# Patient Record
Sex: MALE | Race: WHITE | NOT HISPANIC OR LATINO | Employment: FULL TIME | URBAN - METROPOLITAN AREA
[De-identification: names, ages, dates, MRNs, and addresses within clinical notes are randomized per-mention and may not be internally consistent; named-entity substitution may affect disease eponyms.]

---

## 2017-01-16 ENCOUNTER — TELEPHONE (OUTPATIENT)
Dept: FAMILY MEDICINE | Facility: CLINIC | Age: 47
End: 2017-01-16

## 2017-01-16 NOTE — TELEPHONE ENCOUNTER
Spoke to pharmacy to notify them that we have not received anything stating the pt needed a pa. She will fax over the notice. Once received will initiate.

## 2017-01-16 NOTE — TELEPHONE ENCOUNTER
----- Message from Tim Roberts sent at 1/16/2017  1:32 PM CST -----  Contact: Patient   Patient called regarding Pre Approval for med tadalafil (CIALIS) 20 MG Tab - Stated he has been awaiting med Approval - Please submit approval to [facility] - [place], [place] - [address] [address] - Please call patient at  [phone] to advise when med approved.

## 2017-01-19 ENCOUNTER — TELEPHONE (OUTPATIENT)
Dept: FAMILY MEDICINE | Facility: CLINIC | Age: 47
End: 2017-01-19

## 2017-01-19 NOTE — TELEPHONE ENCOUNTER
----- Message from Marissa Orona sent at 1/19/2017 12:03 PM CST -----  [employer] calling concerning prior authorization for medication: Cialis 20 mg/needs to know if patient has history of certain diseases and conditions/please call back at [phone].

## 2017-01-20 NOTE — TELEPHONE ENCOUNTER
----- Message from Layla Trujillo sent at 1/19/2017  2:19 PM CST -----  Contact: Andreas Patino   [phone]  The representative returning the nurse call regarding PA for rx cialis.Thanks!

## 2017-01-23 ENCOUNTER — TELEPHONE (OUTPATIENT)
Dept: FAMILY MEDICINE | Facility: CLINIC | Age: 47
End: 2017-01-23

## 2017-01-23 NOTE — TELEPHONE ENCOUNTER
----- Message from Inessa Zurita sent at 1/23/2017  3:15 PM CST -----  Contact: Patient  Gaetano, patient [phone], Returning call to Diane regarding his Rx. Please advise, Thanks.

## 2017-01-23 NOTE — TELEPHONE ENCOUNTER
----- Message from Natacha Santiago sent at 1/23/2017 12:58 PM CST -----  Contact: self  Call placed to supervisor. Patient is upset, has been trying for a month to get a refill on Cyalis with no luck.  Please call back at .

## 2019-08-28 ENCOUNTER — TELEPHONE (OUTPATIENT)
Dept: FAMILY MEDICINE | Facility: CLINIC | Age: 49
End: 2019-08-28

## 2019-12-14 ENCOUNTER — TELEPHONE (OUTPATIENT)
Dept: FAMILY MEDICINE | Facility: CLINIC | Age: 49
End: 2019-12-14

## 2024-01-31 ENCOUNTER — TELEPHONE (OUTPATIENT)
Dept: FAMILY MEDICINE | Facility: CLINIC | Age: 54
End: 2024-01-31
Payer: COMMERCIAL

## 2024-01-31 NOTE — TELEPHONE ENCOUNTER
----- Message from Paula Gallegos sent at 1/31/2024  8:50 AM CST -----  Contact: patient  Type:  Patient Returning Call    Who Called:patient     Who Left Message for Patient:    Does the patient know what this is regarding?:yes     Would the patient rather a call back or a response via [employer]? Call     Best Call Back Number:[phone] (home)      Additional Information:

## 2024-01-31 NOTE — TELEPHONE ENCOUNTER
Pt. Declined appt. With provider.  He states that inspire salesperson gave him incorrect office to contact for implantation of inspire device for DANIELA.

## 2024-02-06 ENCOUNTER — OFFICE VISIT (OUTPATIENT)
Dept: OTOLARYNGOLOGY | Facility: CLINIC | Age: 54
End: 2024-02-06
Payer: COMMERCIAL

## 2024-02-06 VITALS — WEIGHT: 263.44 LBS | HEIGHT: 76 IN | BODY MASS INDEX: 32.08 KG/M2

## 2024-02-06 DIAGNOSIS — J34.2 DNS (DEVIATED NASAL SEPTUM): ICD-10-CM

## 2024-02-06 DIAGNOSIS — Z78.9 INTOLERANCE OF CONTINUOUS POSITIVE AIRWAY PRESSURE (CPAP) VENTILATION: ICD-10-CM

## 2024-02-06 DIAGNOSIS — G47.33 OSA (OBSTRUCTIVE SLEEP APNEA): Primary | ICD-10-CM

## 2024-02-06 DIAGNOSIS — R40.0 DAYTIME SOMNOLENCE: ICD-10-CM

## 2024-02-06 PROCEDURE — 1159F MED LIST DOCD IN RCRD: CPT | Mod: CPTII,S$GLB,, | Performed by: STUDENT IN AN ORGANIZED HEALTH CARE EDUCATION/TRAINING PROGRAM

## 2024-02-06 PROCEDURE — 99204 OFFICE O/P NEW MOD 45 MIN: CPT | Mod: S$GLB,,, | Performed by: STUDENT IN AN ORGANIZED HEALTH CARE EDUCATION/TRAINING PROGRAM

## 2024-02-06 PROCEDURE — 3008F BODY MASS INDEX DOCD: CPT | Mod: CPTII,S$GLB,, | Performed by: STUDENT IN AN ORGANIZED HEALTH CARE EDUCATION/TRAINING PROGRAM

## 2024-02-06 PROCEDURE — 99999 PR PBB SHADOW E&M-EST. PATIENT-LVL III: CPT | Mod: PBBFAC,,, | Performed by: STUDENT IN AN ORGANIZED HEALTH CARE EDUCATION/TRAINING PROGRAM

## 2024-02-06 PROCEDURE — 4010F ACE/ARB THERAPY RXD/TAKEN: CPT | Mod: CPTII,S$GLB,, | Performed by: STUDENT IN AN ORGANIZED HEALTH CARE EDUCATION/TRAINING PROGRAM

## 2024-02-06 RX ORDER — LOSARTAN POTASSIUM 100 MG/1
100 TABLET ORAL DAILY
COMMUNITY

## 2024-02-06 RX ORDER — METOPROLOL SUCCINATE 25 MG/1
25 TABLET, EXTENDED RELEASE ORAL NIGHTLY
COMMUNITY
Start: 2024-01-26

## 2024-02-06 RX ORDER — AMLODIPINE BESYLATE 10 MG/1
10 TABLET ORAL DAILY
COMMUNITY

## 2024-02-06 NOTE — PROGRESS NOTES
Otolaryngology Clinic Note    Subjective:       Patient ID: Gaetano Glass is a 53 y.o. male.    Chief Complaint: consult inspire      History of Present Illness: Gaetano Glass is a 53 y.o. male presenting with DANIELA with CPAP intolerance. Started with nose pillows, would not stay on, tried nasal mask, would not seal, then full face, worked for a bit but not sealing. Last wore 1 month ago. Did notice improved sleep at first with CPAP, but has not managed to tolerate and keep masks on all night. Seal and hoses issue. Tosses and turns at night.   AHI 77.3 on HSAT from 3/31/23. He has looked into inspire. No central apneas on his study.   He does mouth breathe at night.   He has HTN on meds. No other cardiac hx no strokes. He does not need cialis. Hurt lower back.   He had tonsillectomy. No nasal surgeries.   Non smoker.     ESS 15 today.       Past Surgical History:   Procedure Laterality Date    THYROIDECTOMY, PARTIAL      as a child    TONSILLECTOMY      as a child     Past Medical History:   Diagnosis Date    History of hypothyroidism      Social Determinants of Health     Tobacco Use: Medium Risk (2/6/2024)    Patient History     Smoking Tobacco Use: Former     Smokeless Tobacco Use: Unknown     Passive Exposure: Not on file   Alcohol Use: Not on file   Financial Resource Strain: Not on file   Food Insecurity: Not on file   Transportation Needs: Not on file   Physical Activity: Not on file   Stress: Not on file   Social Connections: Not on file   Housing Stability: Not on file   Depression: Not on file     Review of patient's allergies indicates:   Allergen Reactions    Pcn [penicillins] Anaphylaxis    Tetanus vaccines and toxoid Hives    Lisinopril Other (See Comments)     Current Outpatient Medications   Medication Instructions    amLODIPine (NORVASC) 10 mg, Oral    fluticasone (FLONASE) 50 mcg/actuation nasal spray 1 spray, Each Nostril, 2 times daily PRN    ibuprofen (ADVIL,MOTRIN) 600 mg, Oral, Every 6 hours PRN     losartan (COZAAR) 100 mg, Oral    metoprolol succinate (TOPROL-XL) 25 mg, Oral    tadalafiL (CIALIS) 20 mg, Oral, Daily         ENT ROS negative except as stated above.     Patient answers are not available for this visit.            Objective:      There were no vitals filed for this visit.    General: NAD, well appearing  Eyes: Normal conjunctiva and lids  Face: symmetric, nerve intact  Nose: The nose is without any evidence of any deformity. The nasal mucosa is moist. The septum has spur on left with mild obstruction on left. There is no evidence of septal hematoma. The turbinates are without abnormality.   Ears: The ears are with normal-appearing pinna. Examination of the canals is normal appearing bilaterally. There is no drainage or erythema noted. The tympanic membranes are normal appearing with pearly color, normal-appearing landmarks and normal light reflex. Hearing is grossly intact.  Mouth: No obvious abnormalities to the lips. The teeth are unremarkable. The gingivae are without any obvious evidence of infection or lesion. The oral mucosa is moist and pink. There are no obvious masses to the hard or soft palate.   Oropharynx: The uvula is midline.  The tongue is midline. The posterior pharynx is without erythema or exudate. The tonsils are absent. Uvula is elongated.  Salivary glands: The salivary glands are symmetric and not enlarged, no masses  Neck: No lymphadenopathy, trachea midline, thryoid not enlarged, large neck circumference  Psych: Normal mood and affect.   Neuro: Grossly intact  Speech: fluent  Central adiposity    Test Review: I personally reviewed [facility] 3/2023 with 77.3 AHI     Assessment and Plan:       1. DANIELA (obstructive sleep apnea)    2. Intolerance of continuous positive airway pressure (CPAP) ventilation    3. DNS (deviated nasal septum)    4. Daytime somnolence          Reviewed inspire criteria and surgery. He would like to proceed with sleep endoscopy. He does have DNS but no nasal  complaints    I discussed the risks of hypoglossal nerve stimulation including: scar, bleeding, numbness of incision, numbness or weakness of tongue or marginal mandibular nerve, irritation of tongue from stimulation, implant failure, inadequate improvement, need for further procedures, need for removal of implant, infection, pneumothorax, MRI incompatibility.     RTC: TBD    Plan of care was discussed in detail with the patient, who agreed with the plan as above. All questions were answered in detail.     Liane Camacho MD  Otolaryngology

## 2024-02-06 NOTE — H&P (VIEW-ONLY)
Otolaryngology Clinic Note    Subjective:       Patient ID: Gaetano Glass is a 53 y.o. male.    Chief Complaint: consult inspire      History of Present Illness: Gaetano Glass is a 53 y.o. male presenting with DANIELA with CPAP intolerance. Started with nose pillows, would not stay on, tried nasal mask, would not seal, then full face, worked for a bit but not sealing. Last wore 1 month ago. Did notice improved sleep at first with CPAP, but has not managed to tolerate and keep masks on all night. Seal and hoses issue. Tosses and turns at night.   AHI 77.3 on HSAT from 3/31/23. He has looked into inspire. No central apneas on his study.   He does mouth breathe at night.   He has HTN on meds. No other cardiac hx no strokes. He does not need cialis. Hurt lower back.   He had tonsillectomy. No nasal surgeries.   Non smoker.     ESS 15 today.       Past Surgical History:   Procedure Laterality Date    THYROIDECTOMY, PARTIAL      as a child    TONSILLECTOMY      as a child     Past Medical History:   Diagnosis Date    History of hypothyroidism      Social Determinants of Health     Tobacco Use: Medium Risk (2/6/2024)    Patient History     Smoking Tobacco Use: Former     Smokeless Tobacco Use: Unknown     Passive Exposure: Not on file   Alcohol Use: Not on file   Financial Resource Strain: Not on file   Food Insecurity: Not on file   Transportation Needs: Not on file   Physical Activity: Not on file   Stress: Not on file   Social Connections: Not on file   Housing Stability: Not on file   Depression: Not on file     Review of patient's allergies indicates:   Allergen Reactions    Pcn [penicillins] Anaphylaxis    Tetanus vaccines and toxoid Hives    Lisinopril Other (See Comments)     Current Outpatient Medications   Medication Instructions    amLODIPine (NORVASC) 10 mg, Oral    fluticasone (FLONASE) 50 mcg/actuation nasal spray 1 spray, Each Nostril, 2 times daily PRN    ibuprofen (ADVIL,MOTRIN) 600 mg, Oral, Every 6 hours PRN     losartan (COZAAR) 100 mg, Oral    metoprolol succinate (TOPROL-XL) 25 mg, Oral    tadalafiL (CIALIS) 20 mg, Oral, Daily         ENT ROS negative except as stated above.     Patient answers are not available for this visit.            Objective:      There were no vitals filed for this visit.    General: NAD, well appearing  Eyes: Normal conjunctiva and lids  Face: symmetric, nerve intact  Nose: The nose is without any evidence of any deformity. The nasal mucosa is moist. The septum has spur on left with mild obstruction on left. There is no evidence of septal hematoma. The turbinates are without abnormality.   Ears: The ears are with normal-appearing pinna. Examination of the canals is normal appearing bilaterally. There is no drainage or erythema noted. The tympanic membranes are normal appearing with pearly color, normal-appearing landmarks and normal light reflex. Hearing is grossly intact.  Mouth: No obvious abnormalities to the lips. The teeth are unremarkable. The gingivae are without any obvious evidence of infection or lesion. The oral mucosa is moist and pink. There are no obvious masses to the hard or soft palate.   Oropharynx: The uvula is midline.  The tongue is midline. The posterior pharynx is without erythema or exudate. The tonsils are absent. Uvula is elongated.  Salivary glands: The salivary glands are symmetric and not enlarged, no masses  Neck: No lymphadenopathy, trachea midline, thryoid not enlarged, large neck circumference  Psych: Normal mood and affect.   Neuro: Grossly intact  Speech: fluent  Central adiposity    Test Review: I personally reviewed [facility] 3/2023 with 77.3 AHI     Assessment and Plan:       1. DANIELA (obstructive sleep apnea)    2. Intolerance of continuous positive airway pressure (CPAP) ventilation    3. DNS (deviated nasal septum)    4. Daytime somnolence          Reviewed inspire criteria and surgery. He would like to proceed with sleep endoscopy. He does have DNS but no nasal  complaints    I discussed the risks of hypoglossal nerve stimulation including: scar, bleeding, numbness of incision, numbness or weakness of tongue or marginal mandibular nerve, irritation of tongue from stimulation, implant failure, inadequate improvement, need for further procedures, need for removal of implant, infection, pneumothorax, MRI incompatibility.     RTC: TBD    Plan of care was discussed in detail with the patient, who agreed with the plan as above. All questions were answered in detail.     Liane Camacho MD  Otolaryngology

## 2024-02-21 RX ORDER — SEMAGLUTIDE 0.68 MG/ML
0.25 INJECTION, SOLUTION SUBCUTANEOUS
COMMUNITY

## 2024-02-23 ENCOUNTER — ANESTHESIA EVENT (OUTPATIENT)
Dept: SURGERY | Facility: HOSPITAL | Age: 54
End: 2024-02-23
Payer: COMMERCIAL

## 2024-02-26 ENCOUNTER — HOSPITAL ENCOUNTER (OUTPATIENT)
Facility: HOSPITAL | Age: 54
Discharge: HOME | End: 2024-02-26
Attending: STUDENT IN AN ORGANIZED HEALTH CARE EDUCATION/TRAINING PROGRAM | Admitting: STUDENT IN AN ORGANIZED HEALTH CARE EDUCATION/TRAINING PROGRAM
Payer: COMMERCIAL

## 2024-02-26 ENCOUNTER — ANESTHESIA (OUTPATIENT)
Dept: SURGERY | Facility: HOSPITAL | Age: 54
End: 2024-02-26
Payer: COMMERCIAL

## 2024-02-26 DIAGNOSIS — G47.33 OSA (OBSTRUCTIVE SLEEP APNEA): Primary | ICD-10-CM

## 2024-02-26 DIAGNOSIS — Z78.9 INTOLERANCE OF CONTINUOUS POSITIVE AIRWAY PRESSURE (CPAP) VENTILATION: ICD-10-CM

## 2024-02-26 LAB — GLUCOSE SERPL-MCNC: 85 MG/DL (ref 70–110)

## 2024-02-26 PROCEDURE — 25000003 PHARM REV CODE 250: Mod: PO | Performed by: NURSE ANESTHETIST, CERTIFIED REGISTERED

## 2024-02-26 PROCEDURE — 63600175 PHARM REV CODE 636 W HCPCS: Mod: PO | Performed by: ANESTHESIOLOGY

## 2024-02-26 PROCEDURE — 42975 DISE EVAL SLP DO BRTH FLX DX: CPT | Mod: ,,, | Performed by: STUDENT IN AN ORGANIZED HEALTH CARE EDUCATION/TRAINING PROGRAM

## 2024-02-26 PROCEDURE — 36000708 HC OR TIME LEV III 1ST 15 MIN: Mod: PO | Performed by: STUDENT IN AN ORGANIZED HEALTH CARE EDUCATION/TRAINING PROGRAM

## 2024-02-26 PROCEDURE — 25000003 PHARM REV CODE 250: Mod: PO | Performed by: ANESTHESIOLOGY

## 2024-02-26 PROCEDURE — 63600175 PHARM REV CODE 636 W HCPCS: Mod: PO | Performed by: NURSE ANESTHETIST, CERTIFIED REGISTERED

## 2024-02-26 PROCEDURE — 71000033 HC RECOVERY, INTIAL HOUR: Mod: PO | Performed by: STUDENT IN AN ORGANIZED HEALTH CARE EDUCATION/TRAINING PROGRAM

## 2024-02-26 PROCEDURE — 25000003 PHARM REV CODE 250: Mod: PO | Performed by: STUDENT IN AN ORGANIZED HEALTH CARE EDUCATION/TRAINING PROGRAM

## 2024-02-26 PROCEDURE — 37000008 HC ANESTHESIA 1ST 15 MINUTES: Mod: PO | Performed by: STUDENT IN AN ORGANIZED HEALTH CARE EDUCATION/TRAINING PROGRAM

## 2024-02-26 PROCEDURE — D9220A PRA ANESTHESIA: Mod: CRNA,,, | Performed by: NURSE ANESTHETIST, CERTIFIED REGISTERED

## 2024-02-26 PROCEDURE — 71000015 HC POSTOP RECOV 1ST HR: Mod: PO | Performed by: STUDENT IN AN ORGANIZED HEALTH CARE EDUCATION/TRAINING PROGRAM

## 2024-02-26 PROCEDURE — D9220A PRA ANESTHESIA: Mod: ANES,,, | Performed by: ANESTHESIOLOGY

## 2024-02-26 RX ORDER — LIDOCAINE HYDROCHLORIDE 10 MG/ML
1 INJECTION, SOLUTION EPIDURAL; INFILTRATION; INTRACAUDAL; PERINEURAL ONCE
Status: ACTIVE | OUTPATIENT
Start: 2024-02-26

## 2024-02-26 RX ORDER — FENTANYL CITRATE 50 UG/ML
25 INJECTION, SOLUTION INTRAMUSCULAR; INTRAVENOUS EVERY 5 MIN PRN
Status: ACTIVE | OUTPATIENT
Start: 2024-02-26

## 2024-02-26 RX ORDER — PROPOFOL 10 MG/ML
VIAL (ML) INTRAVENOUS
Status: DISCONTINUED | OUTPATIENT
Start: 2024-02-26 | End: 2024-02-26

## 2024-02-26 RX ORDER — ONDANSETRON HYDROCHLORIDE 2 MG/ML
4 INJECTION, SOLUTION INTRAVENOUS DAILY PRN
Status: ACTIVE | OUTPATIENT
Start: 2024-02-26

## 2024-02-26 RX ORDER — PROPOFOL 10 MG/ML
VIAL (ML) INTRAVENOUS CONTINUOUS PRN
Status: DISCONTINUED | OUTPATIENT
Start: 2024-02-26 | End: 2024-02-26

## 2024-02-26 RX ORDER — LIDOCAINE HYDROCHLORIDE 40 MG/ML
INJECTION, SOLUTION RETROBULBAR
Status: DISCONTINUED | OUTPATIENT
Start: 2024-02-26 | End: 2024-02-26 | Stop reason: HOSPADM

## 2024-02-26 RX ORDER — OXYMETAZOLINE HCL 0.05 %
SPRAY, NON-AEROSOL (ML) NASAL
Status: DISCONTINUED | OUTPATIENT
Start: 2024-02-26 | End: 2024-02-26 | Stop reason: HOSPADM

## 2024-02-26 RX ORDER — LIDOCAINE HYDROCHLORIDE 20 MG/ML
INJECTION INTRAVENOUS
Status: DISCONTINUED | OUTPATIENT
Start: 2024-02-26 | End: 2024-02-26

## 2024-02-26 RX ORDER — SODIUM CHLORIDE, SODIUM LACTATE, POTASSIUM CHLORIDE, CALCIUM CHLORIDE 600; 310; 30; 20 MG/100ML; MG/100ML; MG/100ML; MG/100ML
INJECTION, SOLUTION INTRAVENOUS CONTINUOUS
Status: DISPENSED | OUTPATIENT
Start: 2024-02-26

## 2024-02-26 RX ORDER — OXYMETAZOLINE HCL 0.05 %
2 SPRAY, NON-AEROSOL (ML) NASAL 2 TIMES DAILY
Status: ACTIVE | OUTPATIENT
Start: 2024-02-26 | End: 2024-02-29

## 2024-02-26 RX ORDER — MEPERIDINE HYDROCHLORIDE 50 MG/ML
12.5 INJECTION INTRAMUSCULAR; INTRAVENOUS; SUBCUTANEOUS ONCE AS NEEDED
Status: ACTIVE | OUTPATIENT
Start: 2024-02-26 | End: 2024-02-27

## 2024-02-26 RX ORDER — OXYCODONE HYDROCHLORIDE 5 MG/1
5 TABLET ORAL
Status: ACTIVE | OUTPATIENT
Start: 2024-02-26

## 2024-02-26 RX ORDER — HYDROMORPHONE HYDROCHLORIDE 2 MG/ML
0.2 INJECTION, SOLUTION INTRAMUSCULAR; INTRAVENOUS; SUBCUTANEOUS EVERY 5 MIN PRN
Status: ACTIVE | OUTPATIENT
Start: 2024-02-26

## 2024-02-26 RX ORDER — DIPHENHYDRAMINE HYDROCHLORIDE 50 MG/ML
12.5 INJECTION INTRAMUSCULAR; INTRAVENOUS EVERY 6 HOURS PRN
Status: ACTIVE | OUTPATIENT
Start: 2024-02-26

## 2024-02-26 RX ADMIN — SODIUM CHLORIDE, SODIUM LACTATE, POTASSIUM CHLORIDE, AND CALCIUM CHLORIDE: .6; .31; .03; .02 INJECTION, SOLUTION INTRAVENOUS at 11:02

## 2024-02-26 RX ADMIN — OXYMETAZOLINE HYDROCHLORIDE 2 SPRAY: 0.05 SPRAY NASAL at 11:02

## 2024-02-26 RX ADMIN — Medication 20 MG: at 12:02

## 2024-02-26 RX ADMIN — LIDOCAINE HYDROCHLORIDE 20 MG: 20 INJECTION INTRAVENOUS at 12:02

## 2024-02-26 RX ADMIN — PROPOFOL 100 MCG/KG/MIN: 10 INJECTION, EMULSION INTRAVENOUS at 12:02

## 2024-02-26 RX ADMIN — Medication 60 MG: at 12:02

## 2024-02-26 RX ADMIN — GLYCOPYRROLATE 0.2 MG: 0.2 INJECTION INTRAMUSCULAR; INTRAVENOUS at 12:02

## 2024-02-26 NOTE — TRANSFER OF CARE
"Anesthesia Transfer of Care Note    Patient: Gaetano Glass    Procedure(s) Performed: Procedure(s) (LRB):  SLEEP ENDOSCOPY,DRUG-INDUCED (Bilateral)    Patient location: PACU    Anesthesia Type: general    Transport from OR: Transported from OR on room air with adequate spontaneous ventilation    Post pain: adequate analgesia    Post assessment: no apparent anesthetic complications and tolerated procedure well    Post vital signs: stable    Level of consciousness: awake and responds to stimulation    Nausea/Vomiting: no nausea/vomiting    Complications: none    Transfer of care protocol was followed      Last vitals: Visit Vitals  /77 (BP Location: Right arm, Patient Position: Lying)   Pulse 79   Temp 36.6 °C (97.8 °F)   Resp 18   Ht 6' 4" (1.93 m)   Wt 119.3 kg (263 lb)   SpO2 95%   BMI 32.01 kg/m²     "

## 2024-02-26 NOTE — DISCHARGE SUMMARY
[facility] - Surgery  Discharge Note  Short Stay    Procedure(s) (LRB):  SLEEP ENDOSCOPY,DRUG-INDUCED (Bilateral)      OUTCOME: Patient tolerated treatment/procedure well without complication and is now ready for discharge.    DISPOSITION: Home or Self Care    FINAL DIAGNOSIS:  <principal problem not specified>    FOLLOWUP: In clinic    DISCHARGE INSTRUCTIONS:  No discharge procedures on file.     TIME SPENT ON DISCHARGE: 10 minutes  
No

## 2024-02-26 NOTE — OP NOTE
Otolaryngology- Head & Neck Surgery  Operative Report    Gaetano Glass  [id number]  1970    Date of surgery: 2/26/2024    Preoperative Diagnosis:   DANIELA (obstructive sleep apnea) [G47.33]  Intolerance of continuous positive airway pressure (CPAP) ventilation [Z78.9]    Postoperative Diagnosis:    Same    Procedure:   1. Drug induced sleep endoscopy    Attending:  Liane Camacho MD    Anesthesia: General     EBL: none    Complications: None    Findings: see below      Disposition: Stable, to PACU    Preoperative Indication:   Gaetano Glass is a 53 y.o. male who has been noted to have severe sleep apnea and CPAP intolerance, here for drug induced sleep endoscopy.       Description of Procedure:  Patient was brought to the operating room and placed on the table in supine position.  Anesthesia was obtained via IV sedation.    Procedure: Risks, benefits, and alternatives of the procedure were discussed with the patient, and the patient consented to the fiberoptic examination.  After adequate anesthesia was obtained, the flexible fiberoptic scope was passed into each nostril independently, with the patient in the supine position.  Each nasal cavity, the entire pharynx (nasopharynx to hypopharynx) and the larynx were visualized. At the end of the examination, the scope was removed. The patient tolerated the procedure well with no complications.     Findings:  -     Nasal septum: LEFT mild deviation   -     Inferior turbinate: hypertrophy or edema (Mild) bilaterally  -     With snoring or modified mullers:    Velopharynx collapse: anterior, very mild lateral    Tongue Base collapse: severe, with <10mm retrolingual space    Other sites of obstruction:  epiglottis collapses posterior with every inspiration obstructing airway  -     Lingual tonsil has mild hypertrophy  -     Larynx mucosa is normal    Posterior commissure has no hypertrophy    no vocal fold immobility    mass/lesion: none  -     Other findings: he is an inspire  candidate, but also likely needs and epiglottic stiffening.         At the end of the procedure, the patient was awakened from anesthesia and transferred to the PACU in good condition.      Liane Camacho MD  Otolaryngology Attending

## 2024-02-26 NOTE — ANESTHESIA PREPROCEDURE EVALUATION
02/26/2024  Gaetano Glass is a 53 y.o., male.      Pre-op Assessment    I have reviewed the Patient Summary Reports.     I have reviewed the Nursing Notes. I have reviewed the NPO Status.   I have reviewed the Medications.     Review of Systems  Anesthesia Hx:  No problems with previous Anesthesia                Social:  Former Smoker       Cardiovascular:     Hypertension                                  Hypertension         Pulmonary:        Sleep Apnea                Endocrine:  Diabetes Hypothyroidism   Diabetes                          Physical Exam  General: Well nourished, Cooperative, Alert and Oriented    Airway:  Mallampati: II   Mouth Opening: Normal  TM Distance: Normal  Tongue: Normal    Dental:  Dentures    Chest/Lungs:  Normal Respiratory Rate    Heart:  Rate: Normal        Anesthesia Plan  Type of Anesthesia, risks & benefits discussed:    Anesthesia Type: Gen Natural Airway  Intra-op Monitoring Plan: Standard ASA Monitors  Induction:  IV  Informed Consent: Informed consent signed with the Patient and all parties understand the risks and agree with anesthesia plan.  All questions answered.   ASA Score: 3    Ready For Surgery From Anesthesia Perspective.     .

## 2024-02-26 NOTE — PLAN OF CARE
Called patient post op to discuss his sleep endoscopy.   He is an inspire candidate but epiglottis collapses significantly posteriorly every time he inhales while asleep. It does improve with jaw thrust but he is also stimulated by this so I am not sure that inspire will solve this alone. While I cannot guarantee that epiglottic stiffening along with resolve his sleep apnea as his AHI is 77.3, I think it is reasonable to do the less invasive option first and recheck his sleep apnea. I did offer doing epiglottic stiffening and inspire at the same time, he is ok staging and assessing in between. We will likely get a sleep study 2-3 months after surgery.     Case request placed for direct laryngoscopy with epiglottic stiffening.   Risks include pain, bleeding, infection, dysphagia, damage to teeth, nearby structures, throat discomfort. This will be outpatient and he will need to plan for light activity for about 10 days. He agrees to proceed.     Liane Camacho MD

## 2024-02-27 ENCOUNTER — TELEPHONE (OUTPATIENT)
Dept: OTOLARYNGOLOGY | Facility: CLINIC | Age: 54
End: 2024-02-27
Payer: COMMERCIAL

## 2024-02-27 VITALS
DIASTOLIC BLOOD PRESSURE: 83 MMHG | OXYGEN SATURATION: 98 % | WEIGHT: 263 LBS | HEART RATE: 60 BPM | BODY MASS INDEX: 32.03 KG/M2 | RESPIRATION RATE: 18 BRPM | SYSTOLIC BLOOD PRESSURE: 146 MMHG | HEIGHT: 76 IN | TEMPERATURE: 98 F

## 2024-02-27 NOTE — TELEPHONE ENCOUNTER
----- Message from Liane Camacho MD sent at 2/26/2024  4:46 PM CST -----  Case request placed for DL with epiglottic stiffening. Needs to be [facility] for equipment. thanks

## 2024-03-20 ENCOUNTER — TELEPHONE (OUTPATIENT)
Dept: OTOLARYNGOLOGY | Facility: CLINIC | Age: 54
End: 2024-03-20
Payer: COMMERCIAL

## 2024-03-20 NOTE — TELEPHONE ENCOUNTER
Called patient to review. Pain worse POD 4-5. Pain with carbonation, ravioli with tomato sauce. Doing ok with mashed potatoes mostly.    Advised him to give this another week or so of monitoring diet and should continue to improve.     Liane Camacho MD

## 2024-03-20 NOTE — TELEPHONE ENCOUNTER
----- Message from Karen Celine sent at 3/20/2024  3:13 PM CDT -----  Regarding: advice  Contact: patient  Type: Needs Medical Advice  Who Called:  patient  Symptoms (please be specific):    How long has patient had these symptoms:    Pharmacy name and phone #:    Best Call Back Number: [phone]    Additional Information: patient states his swallowing is still not good.  When he drinks a Dr. Pepper it feels like his throat is on fire.  Please call patient to advise.  Thanks!

## 2024-03-20 NOTE — TELEPHONE ENCOUNTER
----- Message from Karen Celine sent at 3/20/2024  3:13 PM CDT -----  Regarding: advice  Contact: patient  Type: Needs Medical Advice  Who Called:  patient  Symptoms (please be specific):    How long has patient had these symptoms:    Pharmacy name and phone #:    Best Call Back Number: [phone]    Additional Information: patient states his swallowing is still not good.  When he drinks a Dr. Pepper it feels like his throat is on fire.  Please call patient to advise.  Thanks!

## 2024-04-04 ENCOUNTER — OFFICE VISIT (OUTPATIENT)
Dept: OTOLARYNGOLOGY | Facility: CLINIC | Age: 54
End: 2024-04-04
Payer: COMMERCIAL

## 2024-04-04 VITALS — BODY MASS INDEX: 30.98 KG/M2 | HEIGHT: 76 IN | WEIGHT: 254.44 LBS

## 2024-04-04 DIAGNOSIS — Z78.9 INTOLERANCE OF CONTINUOUS POSITIVE AIRWAY PRESSURE (CPAP) VENTILATION: ICD-10-CM

## 2024-04-04 DIAGNOSIS — G47.33 OSA (OBSTRUCTIVE SLEEP APNEA): Primary | ICD-10-CM

## 2024-04-04 PROCEDURE — 1159F MED LIST DOCD IN RCRD: CPT | Mod: CPTII,S$GLB,, | Performed by: STUDENT IN AN ORGANIZED HEALTH CARE EDUCATION/TRAINING PROGRAM

## 2024-04-04 PROCEDURE — 4010F ACE/ARB THERAPY RXD/TAKEN: CPT | Mod: CPTII,S$GLB,, | Performed by: STUDENT IN AN ORGANIZED HEALTH CARE EDUCATION/TRAINING PROGRAM

## 2024-04-04 PROCEDURE — 31575 DIAGNOSTIC LARYNGOSCOPY: CPT | Mod: S$GLB,,, | Performed by: STUDENT IN AN ORGANIZED HEALTH CARE EDUCATION/TRAINING PROGRAM

## 2024-04-04 PROCEDURE — 99999 PR PBB SHADOW E&M-EST. PATIENT-LVL III: CPT | Mod: PBBFAC,,, | Performed by: STUDENT IN AN ORGANIZED HEALTH CARE EDUCATION/TRAINING PROGRAM

## 2024-04-04 PROCEDURE — 3044F HG A1C LEVEL LT 7.0%: CPT | Mod: CPTII,S$GLB,, | Performed by: STUDENT IN AN ORGANIZED HEALTH CARE EDUCATION/TRAINING PROGRAM

## 2024-04-04 PROCEDURE — 99024 POSTOP FOLLOW-UP VISIT: CPT | Mod: S$GLB,,, | Performed by: STUDENT IN AN ORGANIZED HEALTH CARE EDUCATION/TRAINING PROGRAM

## 2024-04-04 NOTE — PROGRESS NOTES
ENT POST OP    Epiiglottic stiffening 3/7/24    S: Seems like his sleep is slightly better. Waking up once or not at all. Slightly more rested. Swallowing and pain is better. Wife has noted he is not snoring when napping now.     Exam;   Procedure: Flexible laryngoscopy  Indications: post op  Verbal consent obtained  Anesthesia: lidocaine and phenylephrine nasal spray  Procedure: Scope was passed into right or left nare, to nasopharynx and down to visualize the glottis. Findings below. Patient tolerated procedure well.     - Nose WNL  - Nasopharynx- WNL, no masses  - Oropharynx- BOT symmetric, no masses, lingual tonsils 1-2+. Epiglottis with perforation in right side. Tip abnormal but edges were not touched during surgery. Healed well otherwise.   - Hypopharynx and piriform sinuses- no masses on trumpet maneuver  - Supraglottis- Arytenoids intact, no masses, not edematous or erythematous  - Glottis- Bilateral vocal folds intact with full motion, no masses  - Glottic closure- complete      AHI 77.3 on HSAT from 3/31/23.     A/P:  S/p DISE/epiglottic stiffening. Perforation in epiglottis but no issues related to this.  Suspect sleep is to some degree better. Will get HSAT to assess if he needs inspire or not at this point. Does have some lingual tonsil hypertrophy but inspire more likely to be beneficial if needed.   Reviewed inspire surgery risks and benefits.   Will contact after HSAT    Liane Camacho MD

## 2024-10-03 ENCOUNTER — TELEPHONE (OUTPATIENT)
Dept: OTOLARYNGOLOGY | Facility: CLINIC | Age: 54
End: 2024-10-03
Payer: COMMERCIAL

## 2024-10-03 NOTE — TELEPHONE ENCOUNTER
Pt called to schedule an appt because his sleeping issues have returned. Pt would like to readdress Inspire as an option. Appt made for 10/18, pt confirmed.

## 2024-10-03 NOTE — TELEPHONE ENCOUNTER
----- Message from Rosa sent at 10/3/2024  9:55 AM CDT -----  Regarding: Needs return call  Type: Needs Medical Advice  Who Called:  Alvino    Best Call Back Number: [phone]    Additional Information: Pt states dr did a procedure on him, was told to come back in 6months, he wanted to talk to the office regarding this and regarding a new problem

## 2024-10-18 ENCOUNTER — OFFICE VISIT (OUTPATIENT)
Dept: OTOLARYNGOLOGY | Facility: CLINIC | Age: 54
End: 2024-10-18
Payer: COMMERCIAL

## 2024-10-18 VITALS — HEIGHT: 76 IN | BODY MASS INDEX: 33.13 KG/M2 | WEIGHT: 272.06 LBS

## 2024-10-18 DIAGNOSIS — G47.33 OSA (OBSTRUCTIVE SLEEP APNEA): Primary | ICD-10-CM

## 2024-10-18 DIAGNOSIS — Z78.9 INTOLERANCE OF CONTINUOUS POSITIVE AIRWAY PRESSURE (CPAP) VENTILATION: ICD-10-CM

## 2024-10-18 PROCEDURE — 99999 PR PBB SHADOW E&M-EST. PATIENT-LVL IV: CPT | Mod: PBBFAC,,, | Performed by: STUDENT IN AN ORGANIZED HEALTH CARE EDUCATION/TRAINING PROGRAM

## 2024-10-18 NOTE — PROGRESS NOTES
Otolaryngology Clinic Note    Subjective:       Patient ID: Gaetano Glass is a 53 y.o. male.    Chief Complaint: Sleep Apnea and Consult (Discuss inspire )      History of Present Illness: Gaetano Glass is a 53 y.o. male presenting with DANIELA, CPAP intolerance. S/p DISE/epiglottic stiffening. Perforation in epiglottis noted at post op but no issues related to this.  New HSAT with AHI 16.4. Was doing better sleep wise but worse again. He is snoring more, wife noting worse. Also with fall asleep really easily after work while sitting up. Has to be moving. Is side sleeping. Is spitting his dentures out again. Has gained 10 lbs in past year. Still on ozempic. Does note that since surgery, he is more prone to choking on liquids or saliva. Is not every day. Has to pay more attention to swallowing.      Past Surgical History:   Procedure Laterality Date    MICROLARYNGOSCOPY, WITH EPIGLOTTOPEXY Bilateral 3/7/2024    Procedure: MICROLARYNGOSCOPY,WITH - Epliglottic stiffening;  Surgeon: Liane Camacho MD;  Location: [facility] OR;  Service: ENT;  Laterality: Bilateral;    SLEEP ENDOSCOPY, DRUG-INDUCED Bilateral 2/26/2024    Procedure: SLEEP ENDOSCOPY,DRUG-INDUCED;  Surgeon: Liane Camacho MD;  Location: [facility] OR;  Service: ENT;  Laterality: Bilateral;    THYROIDECTOMY, PARTIAL      as a child    TONSILLECTOMY      as a child     Past Medical History:   Diagnosis Date    Diabetes mellitus     History of hypothyroidism     Hypertension     Sleep apnea     don't use C-PAP     Social Drivers of Health     Tobacco Use: Medium Risk (10/18/2024)    Patient History     Smoking Tobacco Use: Former     Smokeless Tobacco Use: Never     Passive Exposure: Not on file   Alcohol Use: Not At Risk (10/15/2024)    AUDIT-C     Frequency of Alcohol Consumption: Monthly or less     Average Number of Drinks: 3 or 4     Frequency of Binge Drinking: Less than monthly   Financial Resource Strain: Low Risk  (10/15/2024)    Overall Financial Resource Strain  (CARDIA)     Difficulty of Paying Living Expenses: Not hard at all   Food Insecurity: No Food Insecurity (10/15/2024)    Hunger Vital Sign     Worried About Running Out of Food in the Last Year: Never true     Ran Out of Food in the Last Year: Never true   Transportation Needs: Not on file   Physical Activity: Unknown (10/15/2024)    Exercise Vital Sign     Days of Exercise per Week: 5 days     Minutes of Exercise per Session: Not on file   Stress: Stress Concern Present (10/15/2024)    [employer] - Occupational Stress Questionnaire     Feeling of Stress : To some extent   Housing Stability: Unknown (10/15/2024)    Housing Stability Vital Sign     Unable to Pay for Housing in the Last Year: No     Homeless in the Last Year: Not on file   Depression: Not at risk (3/30/2022)    [facility]    PHQ-2     PHQ-2 Score: 0   Utilities: Not At Risk (10/15/2024)    [facility] Utilities     Threatened with loss of utilities: No   Health Literacy: Adequate Health Literacy (10/15/2024)     Health Literacy     Frequency of need for help with medical instructions: Never   Social Isolation: Not on file     Review of patient's allergies indicates:   Allergen Reactions    Pcn [penicillins] Anaphylaxis    Tetanus vaccines and toxoid Hives    Lisinopril Other (See Comments)     Current Outpatient Medications   Medication Instructions    amLODIPine (NORVASC) 10 mg, Daily    fluticasone (FLONASE) 50 mcg/actuation nasal spray 1 spray, Each Nostril, 2 times daily PRN    ibuprofen (ADVIL,MOTRIN) 600 mg, Every 6 hours PRN    losartan (COZAAR) 100 mg, Daily    metoprolol succinate (TOPROL-XL) 25 mg, Nightly    ondansetron (ZOFRAN-ODT) 4 mg, Oral, Every 6 hours PRN    OZEMPIC 0.25 mg, Every Monday    tadalafiL (CIALIS) 20 mg, Oral, Daily         ENT ROS negative except as stated above.     Patient answers are not available for this visit.            Objective:       There were no vitals filed for this visit.    General: NAD, well appearing, overweight.   Eyes: Normal conjunctiva and lids  Face: symmetric, nerve intact  Nose: The nose is without any evidence of any deformity. The nasal mucosa is moist. The septum is midline. There is no evidence of septal hematoma. The turbinates are without abnormality.   Ears: The ears are with normal-appearing pinna. Examination of the canals is normal appearing bilaterally. There is no drainage or erythema noted. The tympanic membranes are normal appearing with pearly color, normal-appearing landmarks and normal light reflex. Hearing is grossly intact.  Mouth: No obvious abnormalities to the lips. The teeth are unremarkable. The gingivae are without any obvious evidence of infection or lesion. The oral mucosa is moist and pink. There are no obvious masses to the hard or soft palate.   Oropharynx: The uvula is midline.  The tongue is midline. The posterior pharynx is without erythema or exudate. The tonsils are normal appearing.  Salivary glands: The salivary glands are symmetric and not enlarged, no masses  Neck: No lymphadenopathy, trachea midline, thryoid not enlarged.  Psych: Normal mood and affect.   Neuro: Grossly intact  Speech: fluent    Test Review: I personally reviewed HSAT      '    DISE 2/26/24: Findings:  -     Nasal septum: LEFT mild deviation   -     Inferior turbinate: hypertrophy or edema (Mild) bilaterally  -     With snoring or modified mullers:                          Velopharynx collapse: anterior, very mild lateral                          Tongue Base collapse: severe, with <10mm retrolingual space                          Other sites of obstruction:  epiglottis collapses posterior with every inspiration obstructing airway  -     Lingual tonsil has mild hypertrophy  -     Larynx       mucosa is normal                          Posterior commissure has no hypertrophy                          no vocal fold immobility                           mass/lesion: none  -     Other findings: he is an inspire candidate, but also likely needs epiglottic stiffening.      Assessment and Plan:       1. DANIELA (obstructive sleep apnea)    2. Intolerance of continuous positive airway pressure (CPAP) ventilation    3. BMI 33.0-33.9,adult            He is interested in the inspire implant. He had DISE Which confirmed pattern of collapse. He had epiglottic stiffening which significantly reduced his AHI but sleep still bothersome and still 16.4 AHI.   I discussed the risks of hypoglossal nerve stimulation including: scar, bleeding, numbness of incision, numbness or weakness of tongue or marginal mandibular nerve, irritation of tongue from stimulation, implant failure, inadequate improvement, need for further procedures, need for removal of implant, infection, pneumothorax, MRI incompatibility.   Body mass index is 33.11 kg/m².    Dysphagia: does have some intermittent issues with liquids since surgery which may be related to perforation in epiglottis. No real surgical options to resolve this so will continue to monitor.     RTC: 1 week post op    Plan of care was discussed in detail with the patient, who agreed with the plan as above. All questions were answered in detail.     Liane Camacho MD  Otolaryngology

## 2024-11-22 ENCOUNTER — ANESTHESIA EVENT (OUTPATIENT)
Dept: SURGERY | Facility: HOSPITAL | Age: 54
End: 2024-11-22
Payer: COMMERCIAL

## 2024-11-25 ENCOUNTER — ANESTHESIA (OUTPATIENT)
Dept: SURGERY | Facility: HOSPITAL | Age: 54
End: 2024-11-25
Payer: COMMERCIAL

## 2024-11-25 ENCOUNTER — HOSPITAL ENCOUNTER (OUTPATIENT)
Dept: RADIOLOGY | Facility: HOSPITAL | Age: 54
Discharge: HOME | End: 2024-11-25
Attending: STUDENT IN AN ORGANIZED HEALTH CARE EDUCATION/TRAINING PROGRAM | Admitting: STUDENT IN AN ORGANIZED HEALTH CARE EDUCATION/TRAINING PROGRAM
Payer: COMMERCIAL

## 2024-11-25 ENCOUNTER — HOSPITAL ENCOUNTER (OUTPATIENT)
Facility: HOSPITAL | Age: 54
Discharge: HOME | End: 2024-11-25
Attending: STUDENT IN AN ORGANIZED HEALTH CARE EDUCATION/TRAINING PROGRAM | Admitting: STUDENT IN AN ORGANIZED HEALTH CARE EDUCATION/TRAINING PROGRAM
Payer: COMMERCIAL

## 2024-11-25 DIAGNOSIS — G47.33 OSA (OBSTRUCTIVE SLEEP APNEA): Primary | ICD-10-CM

## 2024-11-25 DIAGNOSIS — Z78.9 INTOLERANCE OF CONTINUOUS POSITIVE AIRWAY PRESSURE (CPAP) VENTILATION: ICD-10-CM

## 2024-11-25 DIAGNOSIS — G47.30 SLEEP APNEA: ICD-10-CM

## 2024-11-25 LAB — GLUCOSE SERPL-MCNC: 91 MG/DL (ref 70–110)

## 2024-11-25 PROCEDURE — 71000015 HC POSTOP RECOV 1ST HR: Mod: PO | Performed by: STUDENT IN AN ORGANIZED HEALTH CARE EDUCATION/TRAINING PROGRAM

## 2024-11-25 PROCEDURE — 71045 X-RAY EXAM CHEST 1 VIEW: CPT | Mod: TC,FY,PO

## 2024-11-25 PROCEDURE — C1767 GENERATOR, NEURO NON-RECHARG: HCPCS | Mod: PO | Performed by: STUDENT IN AN ORGANIZED HEALTH CARE EDUCATION/TRAINING PROGRAM

## 2024-11-25 PROCEDURE — 71045 X-RAY EXAM CHEST 1 VIEW: CPT | Mod: 26,,, | Performed by: RADIOLOGY

## 2024-11-25 PROCEDURE — C1778 LEAD, NEUROSTIMULATOR: HCPCS | Mod: PO | Performed by: STUDENT IN AN ORGANIZED HEALTH CARE EDUCATION/TRAINING PROGRAM

## 2024-11-25 PROCEDURE — 25000242 PHARM REV CODE 250 ALT 637 W/ HCPCS: Mod: PO | Performed by: ANESTHESIOLOGY

## 2024-11-25 PROCEDURE — 64582 OPN MPLTJ HPGLSL NSTM ARY PG: CPT | Mod: RT,,, | Performed by: STUDENT IN AN ORGANIZED HEALTH CARE EDUCATION/TRAINING PROGRAM

## 2024-11-25 PROCEDURE — 82962 GLUCOSE BLOOD TEST: CPT | Mod: PO | Performed by: STUDENT IN AN ORGANIZED HEALTH CARE EDUCATION/TRAINING PROGRAM

## 2024-11-25 PROCEDURE — C1787 PATIENT PROGR, NEUROSTIM: HCPCS | Mod: PO | Performed by: STUDENT IN AN ORGANIZED HEALTH CARE EDUCATION/TRAINING PROGRAM

## 2024-11-25 PROCEDURE — 27201423 OPTIME MED/SURG SUP & DEVICES STERILE SUPPLY: Mod: PO | Performed by: STUDENT IN AN ORGANIZED HEALTH CARE EDUCATION/TRAINING PROGRAM

## 2024-11-25 PROCEDURE — 63600175 PHARM REV CODE 636 W HCPCS: Mod: PO | Performed by: NURSE ANESTHETIST, CERTIFIED REGISTERED

## 2024-11-25 PROCEDURE — D9220A PRA ANESTHESIA: Mod: ANES,,, | Performed by: ANESTHESIOLOGY

## 2024-11-25 PROCEDURE — 37000009 HC ANESTHESIA EA ADD 15 MINS: Mod: PO | Performed by: STUDENT IN AN ORGANIZED HEALTH CARE EDUCATION/TRAINING PROGRAM

## 2024-11-25 PROCEDURE — 63600175 PHARM REV CODE 636 W HCPCS: Mod: PO | Performed by: STUDENT IN AN ORGANIZED HEALTH CARE EDUCATION/TRAINING PROGRAM

## 2024-11-25 PROCEDURE — 71000033 HC RECOVERY, INTIAL HOUR: Mod: PO | Performed by: STUDENT IN AN ORGANIZED HEALTH CARE EDUCATION/TRAINING PROGRAM

## 2024-11-25 PROCEDURE — D9220A PRA ANESTHESIA: Mod: CRNA,,, | Performed by: NURSE ANESTHETIST, CERTIFIED REGISTERED

## 2024-11-25 PROCEDURE — 36000706: Mod: PO | Performed by: STUDENT IN AN ORGANIZED HEALTH CARE EDUCATION/TRAINING PROGRAM

## 2024-11-25 PROCEDURE — 63600175 PHARM REV CODE 636 W HCPCS: Mod: PO | Performed by: ANESTHESIOLOGY

## 2024-11-25 PROCEDURE — 25000003 PHARM REV CODE 250: Mod: PO | Performed by: NURSE ANESTHETIST, CERTIFIED REGISTERED

## 2024-11-25 PROCEDURE — 37000008 HC ANESTHESIA 1ST 15 MINUTES: Mod: PO | Performed by: STUDENT IN AN ORGANIZED HEALTH CARE EDUCATION/TRAINING PROGRAM

## 2024-11-25 PROCEDURE — 36000707: Mod: PO | Performed by: STUDENT IN AN ORGANIZED HEALTH CARE EDUCATION/TRAINING PROGRAM

## 2024-11-25 DEVICE — IMPLANTABLE DEVICE: Type: IMPLANTABLE DEVICE | Site: NECK | Status: FUNCTIONAL

## 2024-11-25 DEVICE — GENERATOR PULSE IMPLANTABLE: Type: IMPLANTABLE DEVICE | Site: CHEST | Status: FUNCTIONAL

## 2024-11-25 RX ORDER — HYDROCODONE BITARTRATE AND ACETAMINOPHEN 5; 325 MG/1; MG/1
1 TABLET ORAL EVERY 6 HOURS PRN
Qty: 12 TABLET | Refills: 0 | Status: SHIPPED | OUTPATIENT
Start: 2024-11-25 | End: 2024-11-28

## 2024-11-25 RX ORDER — ROCURONIUM BROMIDE 10 MG/ML
INJECTION, SOLUTION INTRAVENOUS
Status: DISCONTINUED | OUTPATIENT
Start: 2024-11-25 | End: 2024-11-26

## 2024-11-25 RX ORDER — OXYCODONE HYDROCHLORIDE 5 MG/1
5 TABLET ORAL
Status: DISCONTINUED | OUTPATIENT
Start: 2024-11-25 | End: 2024-11-25 | Stop reason: HOSPADM

## 2024-11-25 RX ORDER — ACETAMINOPHEN 10 MG/ML
INJECTION, SOLUTION INTRAVENOUS
Status: DISCONTINUED | OUTPATIENT
Start: 2024-11-25 | End: 2024-11-26

## 2024-11-25 RX ORDER — LIDOCAINE HYDROCHLORIDE 20 MG/ML
INJECTION INTRAVENOUS
Status: DISCONTINUED | OUTPATIENT
Start: 2024-11-25 | End: 2024-11-26

## 2024-11-25 RX ORDER — LIDOCAINE HYDROCHLORIDE 10 MG/ML
1 INJECTION, SOLUTION EPIDURAL; INFILTRATION; INTRACAUDAL; PERINEURAL ONCE
Status: DISCONTINUED | OUTPATIENT
Start: 2024-11-25 | End: 2024-11-25 | Stop reason: HOSPADM

## 2024-11-25 RX ORDER — EPHEDRINE SULFATE 50 MG/ML
INJECTION, SOLUTION INTRAVENOUS
Status: DISCONTINUED | OUTPATIENT
Start: 2024-11-25 | End: 2024-11-26

## 2024-11-25 RX ORDER — HYDROMORPHONE HYDROCHLORIDE 2 MG/ML
0.2 INJECTION, SOLUTION INTRAMUSCULAR; INTRAVENOUS; SUBCUTANEOUS EVERY 5 MIN PRN
Status: DISCONTINUED | OUTPATIENT
Start: 2024-11-25 | End: 2024-11-25 | Stop reason: HOSPADM

## 2024-11-25 RX ORDER — GLUCAGON 1 MG
1 KIT INJECTION
Status: DISCONTINUED | OUTPATIENT
Start: 2024-11-25 | End: 2024-11-25 | Stop reason: HOSPADM

## 2024-11-25 RX ORDER — SUCCINYLCHOLINE CHLORIDE 20 MG/ML
INJECTION INTRAMUSCULAR; INTRAVENOUS
Status: DISCONTINUED | OUTPATIENT
Start: 2024-11-25 | End: 2024-11-26

## 2024-11-25 RX ORDER — SULFAMETHOXAZOLE AND TRIMETHOPRIM 800; 160 MG/1; MG/1
1 TABLET ORAL 2 TIMES DAILY
Qty: 10 TABLET | Refills: 0 | Status: SHIPPED | OUTPATIENT
Start: 2024-11-25 | End: 2024-11-30

## 2024-11-25 RX ORDER — MIDAZOLAM HYDROCHLORIDE 1 MG/ML
INJECTION INTRAMUSCULAR; INTRAVENOUS
Status: DISCONTINUED | OUTPATIENT
Start: 2024-11-25 | End: 2024-11-26

## 2024-11-25 RX ORDER — ONDANSETRON 4 MG/1
4 TABLET, ORALLY DISINTEGRATING ORAL EVERY 6 HOURS PRN
Qty: 20 TABLET | Refills: 0 | Status: SHIPPED | OUTPATIENT
Start: 2024-11-25

## 2024-11-25 RX ORDER — LIDOCAINE HYDROCHLORIDE AND EPINEPHRINE 10; 10 UG/ML; MG/ML
INJECTION, SOLUTION INFILTRATION; PERINEURAL
Status: DISCONTINUED | OUTPATIENT
Start: 2024-11-25 | End: 2024-11-25 | Stop reason: HOSPADM

## 2024-11-25 RX ORDER — DEXAMETHASONE SODIUM PHOSPHATE 4 MG/ML
8 INJECTION, SOLUTION INTRA-ARTICULAR; INTRALESIONAL; INTRAMUSCULAR; INTRAVENOUS; SOFT TISSUE
Status: COMPLETED | OUTPATIENT
Start: 2024-11-25 | End: 2024-11-25

## 2024-11-25 RX ORDER — FENTANYL CITRATE 50 UG/ML
INJECTION, SOLUTION INTRAMUSCULAR; INTRAVENOUS
Status: DISCONTINUED | OUTPATIENT
Start: 2024-11-25 | End: 2024-11-26

## 2024-11-25 RX ORDER — SODIUM CHLORIDE, SODIUM LACTATE, POTASSIUM CHLORIDE, CALCIUM CHLORIDE 600; 310; 30; 20 MG/100ML; MG/100ML; MG/100ML; MG/100ML
INJECTION, SOLUTION INTRAVENOUS CONTINUOUS
Status: DISCONTINUED | OUTPATIENT
Start: 2024-11-25 | End: 2024-11-25 | Stop reason: HOSPADM

## 2024-11-25 RX ORDER — PHENYLEPHRINE HYDROCHLORIDE 10 MG/ML
INJECTION INTRAVENOUS
Status: DISCONTINUED | OUTPATIENT
Start: 2024-11-25 | End: 2024-11-26

## 2024-11-25 RX ORDER — FENTANYL CITRATE 50 UG/ML
25 INJECTION, SOLUTION INTRAMUSCULAR; INTRAVENOUS EVERY 5 MIN PRN
Status: DISCONTINUED | OUTPATIENT
Start: 2024-11-25 | End: 2024-11-25 | Stop reason: HOSPADM

## 2024-11-25 RX ORDER — LEVALBUTEROL 1.25 MG/.5ML
1.25 SOLUTION, CONCENTRATE RESPIRATORY (INHALATION) ONCE
Status: COMPLETED | OUTPATIENT
Start: 2024-11-25 | End: 2024-11-25

## 2024-11-25 RX ORDER — CEFAZOLIN SODIUM 1 G/3ML
INJECTION, POWDER, FOR SOLUTION INTRAMUSCULAR; INTRAVENOUS
Status: DISCONTINUED | OUTPATIENT
Start: 2024-11-25 | End: 2024-11-26

## 2024-11-25 RX ADMIN — EPHEDRINE SULFATE 10 MG: 50 INJECTION INTRAVENOUS at 02:11

## 2024-11-25 RX ADMIN — LIDOCAINE HYDROCHLORIDE 100 MG: 20 INJECTION INTRAVENOUS at 12:11

## 2024-11-25 RX ADMIN — FENTANYL CITRATE 50 MCG: 50 INJECTION, SOLUTION INTRAMUSCULAR; INTRAVENOUS at 12:11

## 2024-11-25 RX ADMIN — PHENYLEPHRINE HYDROCHLORIDE 200 MCG: 10 INJECTION INTRAVENOUS at 12:11

## 2024-11-25 RX ADMIN — PHENYLEPHRINE HYDROCHLORIDE 200 MCG: 10 INJECTION INTRAVENOUS at 01:11

## 2024-11-25 RX ADMIN — SODIUM CHLORIDE, SODIUM LACTATE, POTASSIUM CHLORIDE, AND CALCIUM CHLORIDE: .6; .31; .03; .02 INJECTION, SOLUTION INTRAVENOUS at 11:11

## 2024-11-25 RX ADMIN — FENTANYL CITRATE 50 MCG: 50 INJECTION, SOLUTION INTRAMUSCULAR; INTRAVENOUS at 02:11

## 2024-11-25 RX ADMIN — ACETAMINOPHEN 1000 MG: 10 INJECTION INTRAVENOUS at 12:11

## 2024-11-25 RX ADMIN — EPHEDRINE SULFATE 10 MG: 50 INJECTION INTRAVENOUS at 01:11

## 2024-11-25 RX ADMIN — FENTANYL CITRATE 50 MCG: 50 INJECTION, SOLUTION INTRAMUSCULAR; INTRAVENOUS at 01:11

## 2024-11-25 RX ADMIN — SUCCINYLCHOLINE CHLORIDE 200 MG: 20 INJECTION, SOLUTION INTRAMUSCULAR; INTRAVENOUS at 12:11

## 2024-11-25 RX ADMIN — PHENYLEPHRINE HYDROCHLORIDE 100 MCG: 10 INJECTION INTRAVENOUS at 12:11

## 2024-11-25 RX ADMIN — CEFAZOLIN 2 G: 330 INJECTION, POWDER, FOR SOLUTION INTRAMUSCULAR; INTRAVENOUS at 12:11

## 2024-11-25 RX ADMIN — MIDAZOLAM HYDROCHLORIDE 2 MG: 2 INJECTION, SOLUTION INTRAMUSCULAR; INTRAVENOUS at 11:11

## 2024-11-25 RX ADMIN — LEVALBUTEROL 1.25 MG: 1.25 SOLUTION, CONCENTRATE RESPIRATORY (INHALATION) at 03:11

## 2024-11-25 RX ADMIN — SODIUM CHLORIDE, SODIUM LACTATE, POTASSIUM CHLORIDE, AND CALCIUM CHLORIDE: .6; .31; .03; .02 INJECTION, SOLUTION INTRAVENOUS at 12:11

## 2024-11-25 RX ADMIN — ROCURONIUM BROMIDE 5 MG: 10 INJECTION, SOLUTION INTRAVENOUS at 12:11

## 2024-11-25 RX ADMIN — DEXAMETHASONE SODIUM PHOSPHATE 8 MG: 4 INJECTION INTRA-ARTICULAR; INTRALESIONAL; INTRAMUSCULAR; INTRAVENOUS; SOFT TISSUE at 11:11

## 2024-11-25 NOTE — ANESTHESIA PREPROCEDURE EVALUATION
11/25/2024  Gaetano Glass is a 54 y.o., male.      Pre-op Assessment    I have reviewed the Patient Summary Reports.     I have reviewed the Nursing Notes. I have reviewed the NPO Status.   I have reviewed the Medications.     Review of Systems  Anesthesia Hx:  No problems with previous Anesthesia                Social:  Former Smoker       Cardiovascular:     Hypertension, well controlled                                          Pulmonary:        Sleep Apnea                Renal/:  Renal/ Normal                 Neurological:  Neurology Normal                                      Endocrine:  Diabetes, well controlled, type 2 Hypothyroidism        Obesity / BMI > 30, Morbid Obesity / BMI > 40      Physical Exam  General: Well nourished, Cooperative, Alert and Oriented    Airway:  Mallampati: II   Mouth Opening: Normal  TM Distance: Normal  Neck ROM: Normal ROM    Anesthesia Plan  Type of Anesthesia, risks & benefits discussed:    Anesthesia Type: Gen ETT, Gen Supraglottic Airway, Gen Natural Airway, MAC  Intra-op Monitoring Plan: Standard ASA Monitors  Post Op Pain Control Plan: multimodal analgesia  Induction:  IV  Airway Plan: Direct, Video and Fiberoptic, Post-Induction  Informed Consent: Informed consent signed with the Patient and all parties understand the risks and agree with anesthesia plan.  All questions answered.   ASA Score: 3    Ready For Surgery From Anesthesia Perspective.   .

## 2024-11-25 NOTE — OP NOTE
11/25/2024     Gaetano Glass  [id number]  1970    PRE-OPERATIVE DIAGNOSIS  1. DANIELA (obstructive sleep apnea)    2. Intolerance of continuous positive airway pressure (CPAP) ventilation    3. Sleep apnea        POST-OPERATIVE DIAGNOSIS  1. DANIELA (obstructive sleep apnea)    2. Intolerance of continuous positive airway pressure (CPAP) ventilation    3. Sleep apnea        PROCEDURES PERFORMED  Insertion of hypoglossal nerve stimulator electrode and generator and breathing electrode sensor (CPT 32295)    SURGEON: Liane Camacho MD    ASSISTANT: JAM Montoya    ANESTHESIA: General    COMPLICATIONS: None    BLOOD LOSS: 25 mL    SPECIMENS  None    DISPOSITION  PACU    OPERATIVE FINDINGS  Successful placement of stimulating leads around protruding branches of right CN XII and sensing lead in the right 2nd intercostal space. Successful device check with good activation of muscles and respiration sensing.     INDICATIONS  Gaetano Glass is a 54 y.o. male patient with a history of moderate obstructive sleep apnea as measured by sleep study, dated: 4/22/24. They were found to have an ANNELIESE /AHI of: 16.4. They are intolerant of and unable to achieve benefit from positive pressure therapy. he has undergone a pre-operative drug induced sleep endoscopy which confirmed that the velopharynx closure was NOT concentric. They have passed the clinical, polysomnographic, and endoscopic screening criteria and presents today for the implant.     PROCEDURE IN DETAIL  The patient was seen in the pre-operative holding area, and consent was signed. They were wheeled into the operating room and placed supine on the table. Anesthesia was induced via general endotracheal tube and this was secured to the left. The bed was turned. A shoulder roll was placed and the patient was prepped and draped in usual sterile fashion with the head turned to the left. Prior to prepping and draping, electrodes were placed in the genioglossus and hyo/styloglossus  muscle and continuous EMG monitoring was undertaken.     A modified sub-mandibular incision was made in the right upper neck approximately 1 cm below the mandible in the natural skin crease. Dissection was carried down through the subcutaneous tissue and platysma. The inferior border of the submandibular gland was identified as well as the digastric tendon. The submandibular gland and the overlying fascia with the marginal mandibular nerve were retracted superiorly. The digastric was retracted inferiorly. Dissection was carried down into the digastric triangle where the hypoglossal nerve was identified in its usual fashion. The mylohyoid muscle was retracted anteriorly, and the hypoglossal nerve was dissected up towards the floor of the mouth. The lateral branches to retrusor muscles were identified, and tested intra-operatively using the bipolar NIM stimulator. I sequentially identified inclusion branches of C1, genioglossus, and transverse / vertical. I then identified stylo/hyoglossus exclusion branches. I carefully dissected the break point between inclusion and exclusion and the cuff electrode for the hypoglossal nerve stimulator was placed carefully around the inclusion branches. There was not a very clean break point and cuff had to be replaced a second time after testing. Diagnostic evaluation confirmed activation of the genioglossus nerve, resulting in genioglossal activation and tongue protrusion, confirmed visually. The stimulation electrode was then looped under and secured to the digastric tendon on its lateral surface with the provided anchor.     A second 5 cm incision was made in the right upper chest approximately 3 cm lateral to the lateral sternal border over the 2nd rib space. Dissection was carried down to the pectoralis muscle. An inferior pocket was created deep to the subcutaneous layer and superficial to the pectoralis muscle. fascia The stimulation lead was then tunneled in a subplatysmal  plane and brought out into the sub-clavicular pocket. I placed my stay sutures (2-0 silk) for the IPG in the pectoralis fascia. I then carefully divided the fibers of the pectoralis major and retracted the muscle. I identified the subpectoralis fat pad. I then identified the external and intercostal muscles and placed a 3-0 silk in the external intercostal at the medial aspect in preparation for the sensing lead insertion. I carefully dissected a plane between the external and intercostal muscles and the pleural respiration sensor was placed laterally into the pocket overlying the internal intercostals. The sensor was sutured to the fascia using the provided anchors to maintain the sensor facing the pleural space. The hypoglossal lead was tunneled through to the chest incision, avoiding external jugular and anterior jugular. The cuff electrode and the respiration sensing lead were connected to the implantable pulse generator. Diagnostic evaluation was again run, which confirmed a good respiration sensing signal as well as good tongue protrusion stimulation.     The implantable pulse generator was placed in the subclavicular pocket and secured loosely to the pectoralis fascia at the superior aspect using non-resorbable sutures. All the wounds were thoroughly irrigated with bacitracin irrigation. The wounds were then closed in three layers with 3-0 Vicryl, 4-0 Vicryl and Dermabond. Pressure dressings were then applied.      This marked the end of the procedure. The patient was turned back over to the Anesthesia team.  They were awoken and transported back to the PACU in stable condition. Counts were correct. I performed the entire procedure.

## 2024-11-25 NOTE — ANESTHESIA PROCEDURE NOTES
Intubation    Date/Time: 11/25/2024 12:06 PM    Performed by: Gricelda Arcos CRNA  Authorized by: Gonzales Andrade MD    Intubation:     Induction:  Intravenous    Intubated:  Postinduction    Mask Ventilation:  Easy mask    Attempts:  1    Attempted By:  CRNA    Method of Intubation:  Video laryngoscopy    Blade:  Martinez 3    Laryngeal View Grade: Grade I - full view of cords      Difficult Airway Encountered?: No      Complications:  None    Airway Device:  Oral endotracheal tube    Airway Device Size:  8.0    Style/Cuff Inflation:  Cuffed (inflated to minimal occlusive pressure)    Inflation Amount (mL):  7    Tube secured:  23    Secured at:  The lips    Placement Verified By:  Capnometry    Complicating Factors:  None    Findings Post-Intubation:  BS equal bilateral and atraumatic/condition of teeth unchanged

## 2024-11-25 NOTE — DISCHARGE SUMMARY
[facility] - Surgery  Discharge Note  Short Stay    Procedure(s) (LRB):  INSERTION,NEUROSTIMULATOR,HYPOGLOSSAL (Right)      OUTCOME: Patient tolerated treatment/procedure well without complication and is now ready for discharge.    DISPOSITION: Home or Self Care    FINAL DIAGNOSIS:  <principal problem not specified>    FOLLOWUP: In clinic    DISCHARGE INSTRUCTIONS:  No discharge procedures on file.     TIME SPENT ON DISCHARGE: 10 minutes

## 2024-11-25 NOTE — DISCHARGE INSTRUCTIONS
Post-op Insertion of Hypoglossal Nerve Stimulator  Liane Camacho MD  Otolaryngology - [facility]/ - [phone]    WHAT TO DO    1. You should follow-up in 1-2 weeks. We will schedule this appointment, but call if you need to change.   Call me ASAP if you are experiencing any unexpected problems.  2. Use the recommended medications / treatments as described  3. Familiarize yourself with the information in this pamphlet  4. Call me with questions. The number above can be used for the clinic during daytime hours or the  to reach me after hours.     Pain and Activity  You will have pain from the incision sites for 1-2 weeks. This will decrease between day 3-7.   No repetitive or strenuous activity for 4 weeks. You should especially avoid vigorous movement of the right upper extremity. No lifting more than 10 lb from the right extremity for 4 weeks. An exception is the exercises for the NECK, listed below.  Avoid heavy backpack or straps for 4 weeks, particularly on the right side.  You should perform circular neck rolls (10 clockwise and 10 counter clockwise) three times daily, beginning on post-operative day 0. This will reduce the scarring between the lead stimulator and the generator in the chest.     Diet  Make sure to get enough fluids and nutrients. Food and drink guidelines include:  Take lots of fluids. Good choices are water, popsicles, and mild juices. Hydration is the MOST IMPORTANT factor in your nutrition during the healing process. Other examples include Boost, Ensure, and electrolyte containing juices)  No diet restrictions. You may want to eat more of a modified diet (with softer foods) but I am fine with you eating anything in your normal diet.  You may want to avoid spicy/acidic and hard foods during this time, strictly for comfort.     Medication  Give only medications approved by your doctor. Follow directions closely when giving your child medications.  Pain  medication  You should alternate the pain medications so that you are taking one medication up to every 3 hours. An example is Ibuprofen, then Hydrocodone 3 hours later.   Hydrocodone / Acetaminophen - this can be taken every 6 hours as needed for pain. It is usually needed for the first few days regularly. Do not take plain Tylenol (acetaminophen) within 6 hours of this medication.   You should also take an anti-inflammatory for pain in addition to the narcotic, including Ibuprofen/Advil/Motrin. You can take 800 mg every 6 hours as needed for pain.  You will be given an antibiotic for 5 days to reduce the risk of infection       Medication Tips  Avoid fish oil (omega acids) and vitamin E for 1 week.  As your pain lessens, you may Replace doses of prescription pain medications with acetaminophen (Tylenol). However, Do Not take doses of prescription pain medications at the same time as Tylenol because this could cause an overdose of Tylenol.  Do Not drive or operate machinery if taking prescription pain medications  Read each medication label carefully, ask your pharmacist if you have any questions regarding warnings on the label    Incision Care  You may remove the bandages after 24 hours  The incision is closed with skin glue and this will dissolve  Do not apply ointment, lotion, or cream to the incision until your follow-up  You may shower after 24 hours  A small amount of redness and swelling over the incision is normal. Call me if this is continuing to enlarge or the pain begins to increase after it had started to feel better.      When to Call the Doctor  Mild pain and a slight fever are normal after surgery. But call the doctor right if you have any of the following:  Fever: temperature greater than 101  Trouble breathing  Bright red bleeding  Any other concerns

## 2024-11-25 NOTE — H&P
Otolaryngology Clinic Note    Subjective:       Patient ID: Gaetano Glass is a 54 y.o. male.    Chief Complaint: No chief complaint on file.      History of Present Illness: Gaetano Glass is a 54 y.o. male presenting with DANIELA, CPAP intolerance. S/p DISE/epiglottic stiffening. Perforation in epiglottis noted at post op but no issues related to this.  New HSAT with AHI 16.4. Was doing better sleep wise but worse again. He is snoring more, wife noting worse. Also with fall asleep really easily after work while sitting up. Has to be moving. Is side sleeping. Is spitting his dentures out again. Has gained 10 lbs in past year. Still on ozempic. Does note that since surgery, he is more prone to choking on liquids or saliva. Is not every day. Has to pay more attention to swallowing.      Past Surgical History:   Procedure Laterality Date    MICROLARYNGOSCOPY, WITH EPIGLOTTOPEXY Bilateral 3/7/2024    Procedure: MICROLARYNGOSCOPY,WITH - Epliglottic stiffening;  Surgeon: Liane Camacho MD;  Location: [facility] OR;  Service: ENT;  Laterality: Bilateral;    SLEEP ENDOSCOPY, DRUG-INDUCED Bilateral 2/26/2024    Procedure: SLEEP ENDOSCOPY,DRUG-INDUCED;  Surgeon: Liane Camacho MD;  Location: [facility] OR;  Service: ENT;  Laterality: Bilateral;    THYROIDECTOMY, PARTIAL      as a child    TONSILLECTOMY      as a child     Past Medical History:   Diagnosis Date    Diabetes mellitus     History of hypothyroidism     Hypertension     Sleep apnea     don't use C-PAP     Social Drivers of Health     Tobacco Use: Medium Risk (11/20/2024)    Patient History     Smoking Tobacco Use: Former     Smokeless Tobacco Use: Never     Passive Exposure: Not on file   Alcohol Use: Not At Risk (10/15/2024)    AUDIT-C     Frequency of Alcohol Consumption: Monthly or less     Average Number of Drinks: 3 or 4     Frequency of Binge Drinking: Less than monthly   Financial Resource Strain: Low Risk  (10/15/2024)    Overall Financial Resource Strain (CARDIA)      Difficulty of Paying Living Expenses: Not hard at all   Food Insecurity: No Food Insecurity (10/15/2024)    Hunger Vital Sign     Worried About Running Out of Food in the Last Year: Never true     Ran Out of Food in the Last Year: Never true   Transportation Needs: Not on file   Physical Activity: Unknown (10/15/2024)    Exercise Vital Sign     Days of Exercise per Week: 5 days     Minutes of Exercise per Session: Not on file   Stress: Stress Concern Present (10/15/2024)    [place] [employer] - Occupational Stress Questionnaire     Feeling of Stress : To some extent   Housing Stability: Unknown (10/15/2024)    Housing Stability Vital Sign     Unable to Pay for Housing in the Last Year: No     Homeless in the Last Year: Not on file   Depression: Not at risk (3/30/2022)    [facility]    PHQ-2     PHQ-2 Score: 0   Utilities: Not At Risk (10/15/2024)    [facility]     Threatened with loss of utilities: No   Health Literacy: Adequate Health Literacy (10/15/2024)     Health Literacy     Frequency of need for help with medical instructions: Never   Social Isolation: Not on file     Review of patient's allergies indicates:   Allergen Reactions    Pcn [penicillins] Anaphylaxis    Lisinopril Other (See Comments)     Current Outpatient Medications   Medication Instructions    amLODIPine (NORVASC) 10 mg, Daily    fluticasone (FLONASE) 50 mcg/actuation nasal spray 1 spray, Each Nostril, 2 times daily PRN    ibuprofen (ADVIL,MOTRIN) 600 mg, Every 6 hours PRN    losartan (COZAAR) 100 mg, Daily    metoprolol succinate (TOPROL-XL) 25 mg, Nightly    ondansetron (ZOFRAN-ODT) 4 mg, Oral, Every 6 hours PRN    OZEMPIC 0.25 mg, Subcutaneous, Every Monday    tadalafiL (CIALIS) 20 mg, Oral, Daily         ENT ROS negative except as stated above.     Patient answers are not available for this visit.            Objective:      There were no vitals filed for  this visit.    General: NAD, well appearing, overweight.   Eyes: Normal conjunctiva and lids  Face: symmetric, nerve intact  Nose: The nose is without any evidence of any deformity. The nasal mucosa is moist. The septum is midline. There is no evidence of septal hematoma. The turbinates are without abnormality.   Ears: The ears are with normal-appearing pinna. Examination of the canals is normal appearing bilaterally. There is no drainage or erythema noted. The tympanic membranes are normal appearing with pearly color, normal-appearing landmarks and normal light reflex. Hearing is grossly intact.  Mouth: No obvious abnormalities to the lips. The teeth are unremarkable. The gingivae are without any obvious evidence of infection or lesion. The oral mucosa is moist and pink. There are no obvious masses to the hard or soft palate.   Oropharynx: The uvula is midline.  The tongue is midline. The posterior pharynx is without erythema or exudate. The tonsils are normal appearing.  Salivary glands: The salivary glands are symmetric and not enlarged, no masses  Neck: No lymphadenopathy, trachea midline, thryoid not enlarged.  Psych: Normal mood and affect.   Neuro: Grossly intact  Speech: fluent    Test Review: I personally reviewed HSAT      '    DISE 2/26/24: Findings:  -     Nasal septum: LEFT mild deviation   -     Inferior turbinate: hypertrophy or edema (Mild) bilaterally  -     With snoring or modified mullers:                          Velopharynx collapse: anterior, very mild lateral                          Tongue Base collapse: severe, with <10mm retrolingual space                          Other sites of obstruction:  epiglottis collapses posterior with every inspiration obstructing airway  -     Lingual tonsil has mild hypertrophy  -     Larynx       mucosa is normal                          Posterior commissure has no hypertrophy                          no vocal fold immobility                           mass/lesion: none  -     Other findings: he is an inspire candidate, but also likely needs epiglottic stiffening.      Assessment and Plan:       DANIELA, CPAP intolerance    He is interested in the inspire implant. He had DISE Which confirmed pattern of collapse. He had epiglottic stiffening which significantly reduced his AHI but sleep still bothersome and still 16.4 AHI.   I discussed the risks of hypoglossal nerve stimulation including: scar, bleeding, numbness of incision, numbness or weakness of tongue or marginal mandibular nerve, irritation of tongue from stimulation, implant failure, inadequate improvement, need for further procedures, need for removal of implant, infection, pneumothorax, MRI incompatibility.   Body mass index is 33.11 kg/m².    Dysphagia: does have some intermittent issues with liquids since surgery which may be related to perforation in epiglottis. No great surgical options so will monitor for now. We can consider trying to close this as it is right sided and there is good tissue on the left and on all edges.     RTC: 1 week post op    Plan of care was discussed in detail with the patient, who agreed with the plan as above. All questions were answered in detail.     Liane Camacho MD  Otolaryngology

## 2024-11-25 NOTE — ANESTHESIA POSTPROCEDURE EVALUATION
Anesthesia Post Evaluation    Patient: Gaetano Glass    Procedure(s) Performed: Procedure(s) (LRB):  INSERTION,NEUROSTIMULATOR,HYPOGLOSSAL (Right)    Final Anesthesia Type: general      Patient location during evaluation: PACU  Patient participation: Yes- Able to Participate  Level of consciousness: awake and alert and oriented  Post-procedure vital signs: reviewed and stable  Pain management: adequate  Airway patency: patent    PONV status at discharge: No PONV  Anesthetic complications: no      Cardiovascular status: blood pressure returned to baseline and stable  Respiratory status: unassisted and spontaneous ventilation  Hydration status: euvolemic  Follow-up not needed.              Vitals Value Taken Time   /70 11/25/24 1521   Temp   11/25/24 1536   Pulse 97 11/25/24 1521   Resp 18 11/25/24 1521   SpO2 96 % 11/25/24 1521         No case tracking events are documented in the log.      Pain/Noah Score: Noah Score: 10 (11/25/2024  3:21 PM)

## 2024-11-26 VITALS
HEART RATE: 96 BPM | OXYGEN SATURATION: 95 % | RESPIRATION RATE: 18 BRPM | TEMPERATURE: 98 F | HEIGHT: 76 IN | DIASTOLIC BLOOD PRESSURE: 61 MMHG | SYSTOLIC BLOOD PRESSURE: 105 MMHG | BODY MASS INDEX: 33.13 KG/M2 | WEIGHT: 272.06 LBS

## 2024-11-26 NOTE — TRANSFER OF CARE
"Anesthesia Transfer of Care Note    Patient: Gaetano Glass    Procedure(s) Performed: Procedure(s) (LRB):  INSERTION,NEUROSTIMULATOR,HYPOGLOSSAL (Right)    Patient location: PACU    Anesthesia Type: general    Transport from OR: Transported from OR on room air with adequate spontaneous ventilation    Post pain: adequate analgesia    Post assessment: tolerated procedure well    Post vital signs: stable    Level of consciousness: responds to stimulation    Nausea/Vomiting: no nausea/vomiting    Complications: none    Transfer of care protocol was followed      Last vitals: Visit Vitals  /61   Pulse 96   Temp 36.7 °C (98.1 °F) (Skin)   Resp 18   Ht 6' 4" (1.93 m)   Wt 123.4 kg (272 lb 0.8 oz)   SpO2 95%   BMI 33.11 kg/m²     "

## 2024-12-03 ENCOUNTER — OFFICE VISIT (OUTPATIENT)
Dept: OTOLARYNGOLOGY | Facility: CLINIC | Age: 54
End: 2024-12-03
Payer: COMMERCIAL

## 2024-12-03 VITALS — BODY MASS INDEX: 33.13 KG/M2 | HEIGHT: 76 IN | WEIGHT: 272.06 LBS

## 2024-12-03 DIAGNOSIS — G89.18 PAIN FOLLOWING SURGERY OR PROCEDURE: Primary | ICD-10-CM

## 2024-12-03 PROCEDURE — 99024 POSTOP FOLLOW-UP VISIT: CPT | Mod: S$GLB,,, | Performed by: STUDENT IN AN ORGANIZED HEALTH CARE EDUCATION/TRAINING PROGRAM

## 2024-12-03 PROCEDURE — 3044F HG A1C LEVEL LT 7.0%: CPT | Mod: CPTII,S$GLB,, | Performed by: STUDENT IN AN ORGANIZED HEALTH CARE EDUCATION/TRAINING PROGRAM

## 2024-12-03 PROCEDURE — 99999 PR PBB SHADOW E&M-EST. PATIENT-LVL III: CPT | Mod: PBBFAC,,, | Performed by: STUDENT IN AN ORGANIZED HEALTH CARE EDUCATION/TRAINING PROGRAM

## 2024-12-03 PROCEDURE — 4010F ACE/ARB THERAPY RXD/TAKEN: CPT | Mod: CPTII,S$GLB,, | Performed by: STUDENT IN AN ORGANIZED HEALTH CARE EDUCATION/TRAINING PROGRAM

## 2024-12-03 PROCEDURE — 1159F MED LIST DOCD IN RCRD: CPT | Mod: CPTII,S$GLB,, | Performed by: STUDENT IN AN ORGANIZED HEALTH CARE EDUCATION/TRAINING PROGRAM

## 2024-12-03 RX ORDER — HYDROCODONE BITARTRATE AND ACETAMINOPHEN 7.5; 325 MG/1; MG/1
1 TABLET ORAL EVERY 6 HOURS PRN
Qty: 12 TABLET | Refills: 0 | Status: SHIPPED | OUTPATIENT
Start: 2024-12-03 | End: 2024-12-06

## 2024-12-03 NOTE — LETTER
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 He is at risk for surgical complications at this time.       If you have any questions or concerns, please don't hesitate to call.    Sincerely,        Liane Camacho MD

## 2024-12-03 NOTE — PROGRESS NOTES
ENT POST OP    POST OP INSPIRE: 11/25    S; pain in neck. No tongue weakness. No infection concerns. Took all his pain meds and abx. Does not think dose he had helped.       O:  Incision neck and chest C/d/I. Has small palpable/slightly visible protrusion where wire leads come out that I can feel in chest, otherwise looks good.   No hematoma or seroma concerns.     A/P:   Worried about going back to work. Would like full 6 weeks to recover.   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   Continue neck stretches.     Liane Camacho MD

## 2025-01-07 ENCOUNTER — OFFICE VISIT (OUTPATIENT)
Dept: OTOLARYNGOLOGY | Facility: CLINIC | Age: 55
End: 2025-01-07
Payer: COMMERCIAL

## 2025-01-07 VITALS — WEIGHT: 269.81 LBS | BODY MASS INDEX: 32.85 KG/M2 | HEIGHT: 76 IN

## 2025-01-07 DIAGNOSIS — Z78.9 INTOLERANCE OF CONTINUOUS POSITIVE AIRWAY PRESSURE (CPAP) VENTILATION: Primary | ICD-10-CM

## 2025-01-07 DIAGNOSIS — G89.18 PAIN FOLLOWING SURGERY OR PROCEDURE: ICD-10-CM

## 2025-01-07 PROCEDURE — 1159F MED LIST DOCD IN RCRD: CPT | Mod: CPTII,S$GLB,, | Performed by: STUDENT IN AN ORGANIZED HEALTH CARE EDUCATION/TRAINING PROGRAM

## 2025-01-07 PROCEDURE — 99999 PR PBB SHADOW E&M-EST. PATIENT-LVL III: CPT | Mod: PBBFAC,,, | Performed by: STUDENT IN AN ORGANIZED HEALTH CARE EDUCATION/TRAINING PROGRAM

## 2025-01-07 PROCEDURE — 99024 POSTOP FOLLOW-UP VISIT: CPT | Mod: S$GLB,,, | Performed by: STUDENT IN AN ORGANIZED HEALTH CARE EDUCATION/TRAINING PROGRAM

## 2025-01-07 NOTE — PROGRESS NOTES
ENT POST OP    POST OP INSPIRE: 11/25    S;doing well. No concerns. Settings and turning on today.       O:  Incision neck and chest C/d/I. No concerns.     A/P:   Follow up with Dr. Mcgee. Healed as expected.   Advised him to follow up with swallowing worsening with epiglottic perforation or if Dr. Mcgee has concerns with settings/device.     Liane Camacho MD

## 2025-06-03 ENCOUNTER — TELEPHONE (OUTPATIENT)
Dept: OTOLARYNGOLOGY | Facility: CLINIC | Age: 55
End: 2025-06-03
Payer: COMMERCIAL

## 2025-07-15 ENCOUNTER — OFFICE VISIT (OUTPATIENT)
Dept: FAMILY MEDICINE | Facility: CLINIC | Age: 55
End: 2025-07-15
Payer: COMMERCIAL

## 2025-07-15 VITALS
BODY MASS INDEX: 35.47 KG/M2 | DIASTOLIC BLOOD PRESSURE: 84 MMHG | SYSTOLIC BLOOD PRESSURE: 112 MMHG | OXYGEN SATURATION: 96 % | HEIGHT: 76 IN | HEART RATE: 74 BPM | TEMPERATURE: 98 F | WEIGHT: 291.25 LBS

## 2025-07-15 DIAGNOSIS — E66.09 CLASS 2 OBESITY DUE TO EXCESS CALORIES WITHOUT SERIOUS COMORBIDITY WITH BODY MASS INDEX (BMI) OF 35.0 TO 35.9 IN ADULT: ICD-10-CM

## 2025-07-15 DIAGNOSIS — E66.812 CLASS 2 OBESITY DUE TO EXCESS CALORIES WITHOUT SERIOUS COMORBIDITY WITH BODY MASS INDEX (BMI) OF 35.0 TO 35.9 IN ADULT: ICD-10-CM

## 2025-07-15 DIAGNOSIS — Z00.00 ENCOUNTER FOR SCREENING AND PREVENTATIVE CARE: Primary | ICD-10-CM

## 2025-07-15 DIAGNOSIS — I10 PRIMARY HYPERTENSION: ICD-10-CM

## 2025-07-15 DIAGNOSIS — Z12.11 COLON CANCER SCREENING: ICD-10-CM

## 2025-07-15 DIAGNOSIS — G47.33 OSA (OBSTRUCTIVE SLEEP APNEA): ICD-10-CM

## 2025-07-15 PROCEDURE — 90471 IMMUNIZATION ADMIN: CPT | Mod: S$GLB,,, | Performed by: EMERGENCY MEDICINE

## 2025-07-15 PROCEDURE — 99204 OFFICE O/P NEW MOD 45 MIN: CPT | Mod: 25,S$GLB,, | Performed by: EMERGENCY MEDICINE

## 2025-07-15 PROCEDURE — 4010F ACE/ARB THERAPY RXD/TAKEN: CPT | Mod: CPTII,S$GLB,, | Performed by: EMERGENCY MEDICINE

## 2025-07-15 PROCEDURE — 3074F SYST BP LT 130 MM HG: CPT | Mod: CPTII,S$GLB,, | Performed by: EMERGENCY MEDICINE

## 2025-07-15 PROCEDURE — 99999 PR PBB SHADOW E&M-EST. PATIENT-LVL IV: CPT | Mod: PBBFAC,,, | Performed by: EMERGENCY MEDICINE

## 2025-07-15 PROCEDURE — 90715 TDAP VACCINE 7 YRS/> IM: CPT | Mod: S$GLB,,, | Performed by: EMERGENCY MEDICINE

## 2025-07-15 PROCEDURE — 1159F MED LIST DOCD IN RCRD: CPT | Mod: CPTII,S$GLB,, | Performed by: EMERGENCY MEDICINE

## 2025-07-15 PROCEDURE — G2211 COMPLEX E/M VISIT ADD ON: HCPCS | Mod: S$GLB,,, | Performed by: EMERGENCY MEDICINE

## 2025-07-15 PROCEDURE — 3008F BODY MASS INDEX DOCD: CPT | Mod: CPTII,S$GLB,, | Performed by: EMERGENCY MEDICINE

## 2025-07-15 PROCEDURE — 3079F DIAST BP 80-89 MM HG: CPT | Mod: CPTII,S$GLB,, | Performed by: EMERGENCY MEDICINE

## 2025-07-15 NOTE — ASSESSMENT & PLAN NOTE
Patient currently taking;    Losartan 100 mg daily  Toprol XL 25 mg daily   Norvasc 10 mg daily    We will monitor and re-evaluate as necessary.  Satisfactory benefit obtained.      Orders:    CBC Auto Differential; Future    Comprehensive Metabolic Panel; Future    HIV 1/2 Ag/Ab (4th Gen); Future    Hepatitis C Antibody; Future    Hemoglobin A1C; Future    Lipid Panel; Future

## 2025-07-15 NOTE — ASSESSMENT & PLAN NOTE
Patient needs some health maintenance items updating, we discussed them and the patient was agreeable with all orders placed.  We will obtain them and then address any issues.  This was discussed with the patient and they are aware that we will address results via the [facility] everette and as needed.    Advised that a healthy lifestyle is built on a foundation of balanced nutrition, regular physical activity, sufficient sleep, stress management, and positive social connections. We discussed prioritizing whole, nutrient-dense foods, staying hydrated, engaging in movement daily, and maintaining mental well-being contribute to overall health. Patient advised to avoid harmful habits like smoking, excessive alcohol consumption, and chronic stress.    Patient is currently trying to get Zepbound for weight loss.

## 2025-07-15 NOTE — PATIENT INSTRUCTIONS
Thank you for visiting us today. We're dedicated to supporting you in achieving your healthiest self.    It's important to keep all scheduled appointments and follow up as discussed to ensure the best outcomes.    Whenever possible, please contact our office for urgent needs rather than going directly to Urgent Care or the Emergency Room.     Of course, if you're experiencing a true emergency, do not hesitate to go to the Emergency Room or call 911!    However, let's work together to use our healthcare resources wisely and reserve emergency services for situations that truly require them.    If you have any questions or anything to add you can reach me via [facility] at any time.       Evelio Urbina M.D.  [facility]  [facility] [place]    If you are happy with your care please consider adding a review at;    Dr. Evelio Urbina MD - Family Medicine Physician in [facility]

## 2025-07-15 NOTE — PROGRESS NOTES
??NEW PATIENT??  Name: Gaetano Glass  MRN: [id number]  : 1970    Subjective     Subjective   HPI  Gaetano Glass is here today to establish care.    Objective     Review of Systems   Constitutional: Negative.    HENT: Negative.     Eyes: Negative.    Respiratory: Negative.     Cardiovascular: Negative.    Gastrointestinal: Negative.    Endocrine: Negative.    Genitourinary: Negative.    Musculoskeletal: Negative.    Skin: Negative.    Allergic/Immunologic: Negative.    Neurological: Negative.    Hematological: Negative.    Psychiatric/Behavioral: Negative.     All other systems reviewed and are negative.       Problem List[1]    Medications Ordered Prior to Encounter[2]    Past Medical History:   Diagnosis Date    Diabetes mellitus     History of hypothyroidism     Hypertension     Sleep apnea     don't use C-PAP       Past Surgical History:   Procedure Laterality Date    INSERTION, NEUROSTIMULATOR, HYPOGLOSSAL Right 2024    Procedure: INSERTION,NEUROSTIMULATOR,HYPOGLOSSAL;  Surgeon: Liane Camacho MD;  Location: [facility] OR;  Service: ENT;  Laterality: Right;    MICROLARYNGOSCOPY, WITH EPIGLOTTOPEXY Bilateral 3/7/2024    Procedure: MICROLARYNGOSCOPY,WITH - Epliglottic stiffening;  Surgeon: Liane Camacho MD;  Location: [facility] OR;  Service: ENT;  Laterality: Bilateral;    SLEEP ENDOSCOPY, DRUG-INDUCED Bilateral 2024    Procedure: SLEEP ENDOSCOPY,DRUG-INDUCED;  Surgeon: Liane Camacho MD;  Location: [facility] OR;  Service: ENT;  Laterality: Bilateral;    THYROIDECTOMY, PARTIAL      as a child    TONSILLECTOMY      as a child        Family History   Problem Relation Name Age of Onset    No Known Problems Mother      Hypertension Father      Coronary artery disease Father          S/P CABG    No Known Problems Sister      Coronary artery disease Brother          S/P CABG    Diabetes Maternal Grandmother      No Known Problems Maternal Grandfather      Hypertension Paternal Grandmother      No Known  Problems Paternal Grandfather      No Known Problems Brother      Lymphoma Maternal Uncle      Breast cancer Maternal Aunt         Social History[3]    Review of patient's allergies indicates:   Allergen Reactions    Pcn [penicillins] Anaphylaxis    Lisinopril Other (See Comments)        Health Maintenance Due   Topic Date Due    HIV Screening  Never done    TETANUS VACCINE  Never done    Colorectal Cancer Screening  Never done    Lipid Panel  10/27/2021    Pneumococcal Vaccines (Age 50+) (1 of 1 - PCV) Never done    Shingles Vaccine (2 of 2) 03/22/2024    COVID-19 Vaccine (1 - 2024-25 season) Never done       Objective   Vitals:    07/15/25 1303   BP: 112/84   Pulse: 74   Temp: 97.8 °F (36.6 °C)        Physical Exam  Vitals and nursing note reviewed.   Constitutional:       General: He is not in acute distress.     Appearance: Normal appearance. He is well-developed.   HENT:      Head: Normocephalic and atraumatic.      Right Ear: External ear normal.      Left Ear: External ear normal.      Mouth/Throat:      Mouth: Mucous membranes are moist.   Eyes:      General: Lids are normal. Gaze aligned appropriately.      Extraocular Movements: Extraocular movements intact.      Conjunctiva/sclera: Conjunctivae normal.      Pupils: Pupils are equal, round, and reactive to light.   Cardiovascular:      Rate and Rhythm: Normal rate and regular rhythm.      Heart sounds: No murmur heard.     No friction rub. No gallop.   Pulmonary:      Effort: Pulmonary effort is normal. No respiratory distress.      Breath sounds: Normal breath sounds and air entry. No wheezing, rhonchi or rales.   Abdominal:      General: Abdomen is flat. There is no distension.   Musculoskeletal:         General: No swelling or deformity.      Cervical back: Full passive range of motion without pain, normal range of motion and neck supple.      Right lower leg: No edema.      Left lower leg: No edema.   Skin:     General: Skin is warm and dry.       Coloration: Skin is not jaundiced.   Neurological:      General: No focal deficit present.      Mental Status: He is alert and oriented to person, place, and time. Mental status is at baseline.      GCS: GCS eye subscore is 4. GCS verbal subscore is 5. GCS motor subscore is 6.   Psychiatric:         Attention and Perception: Attention and perception normal.         Mood and Affect: Mood normal.         Speech: Speech normal.         Behavior: Behavior normal. Behavior is cooperative.         Thought Content: Thought content normal.         Cognition and Memory: Cognition normal.         Judgment: Judgment normal.          Screening Tools     ?? Alcohol Use Screening ??    Patient denies Alcohol use []    CAGE Questionnaire:  - Have you ever felt you should Cut down on your drinking?  [] Yes   [] No  - Have people Annoyed you by criticizing your drinking?     [] Yes   [] No  - Have you ever felt Guilty about your drinking?            [] Yes   [] No  - Have you ever had a drink first thing in the morning       to steady your nerves or get rid of a hangover (Eye-opener)? [] Yes   [] No    [] CAGE score >=2: Positive screen for potential alcohol use disorder  [] CAGE score <=2: Negative screen for potential alcohol use disorder    Medical Decision Making     Assessment & Plan  Encounter for screening and preventative care  Patient needs some health maintenance items updating, we discussed them and the patient was agreeable with all orders placed.  We will obtain them and then address any issues.  This was discussed with the patient and they are aware that we will address results via the [facility]rette and as needed.    Advised that a healthy lifestyle is built on a foundation of balanced nutrition, regular physical activity, sufficient sleep, stress management, and positive social connections. We discussed prioritizing whole, nutrient-dense foods, staying hydrated, engaging in movement daily, and maintaining mental  well-being contribute to overall health. Patient advised to avoid harmful habits like smoking, excessive alcohol consumption, and chronic stress.      Orders:    CBC Auto Differential; Future    Comprehensive Metabolic Panel; Future    HIV 1/2 Ag/Ab (4th Gen); Future    Hepatitis C Antibody; Future    Hemoglobin A1C; Future    Lipid Panel; Future    DIPH,PERTUSS(ACEL),TET VAC(PF)(ADULT)(ADACEL)(TDaP)    Primary hypertension  Patient currently taking;    Losartan 100 mg daily  Toprol XL 25 mg daily   Norvasc 10 mg daily    We will monitor and re-evaluate as necessary.  Satisfactory benefit obtained.      Orders:    CBC Auto Differential; Future    Comprehensive Metabolic Panel; Future    HIV 1/2 Ag/Ab (4th Gen); Future    Hepatitis C Antibody; Future    Hemoglobin A1C; Future    Lipid Panel; Future    Class 2 obesity due to excess calories without serious comorbidity with body mass index (BMI) of 35.0 to 35.9 in adult  Patient needs some health maintenance items updating, we discussed them and the patient was agreeable with all orders placed.  We will obtain them and then address any issues.  This was discussed with the patient and they are aware that we will address results via the [facility] and as needed.    Advised that a healthy lifestyle is built on a foundation of balanced nutrition, regular physical activity, sufficient sleep, stress management, and positive social connections. We discussed prioritizing whole, nutrient-dense foods, staying hydrated, engaging in movement daily, and maintaining mental well-being contribute to overall health. Patient advised to avoid harmful habits like smoking, excessive alcohol consumption, and chronic stress.    Patient is currently trying to get Zepbound for weight loss.         DANIELA (obstructive sleep apnea)  Patient had inspire placed. Followed by  at [facility].         Colon cancer screening      Orders:    Case Request Endoscopy: COLONOSCOPY         Additional  Discussion     ?? Vaccine discussion ??    Patient declined the following vaccines today after discussion of risk / benefits:     [] Tetanus  [] Pneumococcal  [x] COVID 19  [] Respiratory Syncytial Virus  [] Shingles / Zoster    Patient will go to the Pharmacy for the following vaccines, we discussed risks / benefits:    [] Tetanus  [x] Pneumococcal  [] COVID 19  [] Respiratory Syncytial Virus  [] Shingles / Zoster    Continuity     Follow up  6 months    Patient is encouraged to contact me at anytime via [employer] everette or my office for any needs.    Evelio Urbina MD  Primary Care ??  07/15/2025     DISCLAIMER: This note was prepared with M Mod[employer] Fluency Direct voice recognition transcription software. Garbled syntax, mangled pronouns, and other bizarre constructions may be attributed to that software system.  I attest to having reviewed and edited the generated note for accuracy, though some syntax or spelling errors may persist. Please contact the author of this note for any clarification.         [1]   Patient Active Problem List  Diagnosis    Primary hypertension    Class 2 obesity due to excess calories without serious comorbidity with body mass index (BMI) of 35.0 to 35.9 in adult   [2]   Current Outpatient Medications on File Prior to Visit   Medication Sig Dispense Refill    amLODIPine (NORVASC) 10 MG tablet Take 10 mg by mouth once daily.      ibuprofen (ADVIL,MOTRIN) 200 MG tablet Take 600 mg by mouth every 6 (six) hours as needed for Pain.      losartan (COZAAR) 100 MG tablet Take 100 mg by mouth once daily.      metoprolol succinate (TOPROL-XL) 25 MG 24 hr tablet Take 25 mg by mouth nightly.      [DISCONTINUED] ondansetron (ZOFRAN-ODT) 4 MG TbDL Take 1 tablet (4 mg total) by mouth every 6 (six) hours as needed. 20 tablet 0    [DISCONTINUED] semaglutide (OZEMPIC) 0.25 mg or 0.5 mg (2 mg/3 mL) pen injector Inject 0.25 mg into the skin every Monday.       Current Facility-Administered Medications on File  Prior to Visit   Medication Dose Route Frequency Provider Last Rate Last Admin    dextrose 50% injection 12.5 g  12.5 g Intravenous PRN Volpi-Abadie, Jacqueline, MD        dextrose 50% injection 25 g  25 g Intravenous PRN Volpi-Abadie, Jacqueline, MD        diphenhydrAMINE injection 12.5 mg  12.5 mg Intravenous Q6H PRN El Ocasio MD        electrolyte-S (ISOLYTE)   Intravenous Continuous El Ocasio MD        fentaNYL 50 mcg/mL injection 25 mcg  25 mcg Intravenous Q5 Min PRN El Ocasio MD        HYDROmorphone (PF) injection 0.2 mg  0.2 mg Intravenous Q5 Min PRN El Ocasio MD        insulin regular injection 0-8 Units  0-8 Units Intravenous Continuous PRN Volpi-Abadie, Jacqueline, MD        lactated ringers infusion   Intravenous Continuous El Ocasio MD 10 mL/hr at 24 1100 New Bag at 24 0953    lactated ringers infusion   Intravenous Continuous Volpi-Abadie, Jacqueline, MD 20 mL/hr at 24 0900 New Bag at 24 0900    LIDOcaine (PF) 10 mg/ml (1%) injection 10 mg  1 mL Intradermal Once El Ocasio MD        ondansetron injection 4 mg  4 mg Intravenous Daily PRN El Ocasio MD        oxyCODONE immediate release tablet 5 mg  5 mg Oral Q3H PRN El Ocasio MD       [3]   Social History  Socioeconomic History    Marital status:    Occupational History    Occupation:    Tobacco Use    Smoking status: Former     Current packs/day: 0.00     Average packs/day: 0.3 packs/day for 26.1 years (6.5 ttl pk-yrs)     Types: Cigarettes     Start date: 10/1/1984     Quit date: 10/22/2010     Years since quittin.7    Smokeless tobacco: Never   Substance and Sexual Activity    Alcohol use: Yes     Alcohol/week: 1.0 standard drink of alcohol     Types: 1 Shots of liquor per week     Comment: occasional jese on the weekend    Drug use: No    Sexual activity: Yes     Partners: Female     Social Drivers of Health     Financial Resource Strain:  Low Risk  (7/8/2025)    Overall Financial Resource Strain (CARDIA)     Difficulty of Paying Living Expenses: Not hard at all   Food Insecurity: No Food Insecurity (7/8/2025)    Hunger Vital Sign     Worried About Running Out of Food in the Last Year: Never true     Ran Out of Food in the Last Year: Never true   Transportation Needs: No Transportation Needs (7/8/2025)    PRAPARE - Transportation     Lack of Transportation (Medical): No     Lack of Transportation (Non-Medical): No   Physical Activity: Inactive (7/8/2025)    Exercise Vital Sign     Days of Exercise per Week: 5 days     Minutes of Exercise per Session: 0 min   Stress: No Stress Concern Present (7/8/2025)    [employer] - Occupational Stress Questionnaire     Feeling of Stress : Not at all   Housing Stability: Low Risk  (7/8/2025)    Housing Stability Vital Sign     Unable to Pay for Housing in the Last Year: No     Number of Times Moved in the Last Year: 0     Homeless in the Last Year: No

## 2025-07-18 ENCOUNTER — LAB VISIT (OUTPATIENT)
Dept: LAB | Facility: HOSPITAL | Age: 55
End: 2025-07-18
Attending: EMERGENCY MEDICINE
Payer: COMMERCIAL

## 2025-07-18 DIAGNOSIS — I10 PRIMARY HYPERTENSION: ICD-10-CM

## 2025-07-18 DIAGNOSIS — Z00.00 ENCOUNTER FOR SCREENING AND PREVENTATIVE CARE: ICD-10-CM

## 2025-07-18 LAB
ABSOLUTE EOSINOPHIL (OHS): 0.43 K/UL
ABSOLUTE MONOCYTE (OHS): 0.66 K/UL (ref 0.3–1)
ABSOLUTE NEUTROPHIL COUNT (OHS): 4.09 K/UL (ref 1.8–7.7)
ALBUMIN SERPL BCP-MCNC: 4.2 G/DL (ref 3.5–5.2)
ALP SERPL-CCNC: 97 UNIT/L (ref 40–150)
ALT SERPL W/O P-5'-P-CCNC: 69 UNIT/L (ref 10–44)
ANION GAP (OHS): 9 MMOL/L (ref 8–16)
AST SERPL-CCNC: 38 UNIT/L (ref 11–45)
BASOPHILS # BLD AUTO: 0.03 K/UL
BASOPHILS NFR BLD AUTO: 0.4 %
BILIRUB SERPL-MCNC: 0.6 MG/DL (ref 0.1–1)
BUN SERPL-MCNC: 11 MG/DL (ref 6–20)
CALCIUM SERPL-MCNC: 9.4 MG/DL (ref 8.7–10.5)
CHLORIDE SERPL-SCNC: 107 MMOL/L (ref 95–110)
CHOLEST SERPL-MCNC: 180 MG/DL (ref 120–199)
CHOLEST/HDLC SERPL: 5 {RATIO} (ref 2–5)
CO2 SERPL-SCNC: 26 MMOL/L (ref 23–29)
CREAT SERPL-MCNC: 1.1 MG/DL (ref 0.5–1.4)
EAG (OHS): 134 MG/DL (ref 68–131)
ERYTHROCYTE [DISTWIDTH] IN BLOOD BY AUTOMATED COUNT: 13.9 % (ref 11.5–14.5)
GFR SERPLBLD CREATININE-BSD FMLA CKD-EPI: >60 ML/MIN/1.73/M2
GLUCOSE SERPL-MCNC: 96 MG/DL (ref 70–110)
HBA1C MFR BLD: 6.3 % (ref 4–5.6)
HCT VFR BLD AUTO: 54.5 % (ref 40–54)
HCV AB SERPL QL IA: NORMAL
HDLC SERPL-MCNC: 36 MG/DL (ref 40–75)
HDLC SERPL: 20 % (ref 20–50)
HGB BLD-MCNC: 17.7 GM/DL (ref 14–18)
HIV 1+2 AB+HIV1 P24 AG SERPL QL IA: NORMAL
IMM GRANULOCYTES # BLD AUTO: 0.02 K/UL (ref 0–0.04)
IMM GRANULOCYTES NFR BLD AUTO: 0.3 % (ref 0–0.5)
LDLC SERPL CALC-MCNC: 118.2 MG/DL (ref 63–159)
LYMPHOCYTES # BLD AUTO: 1.86 K/UL (ref 1–4.8)
MCH RBC QN AUTO: 30.5 PG (ref 27–31)
MCHC RBC AUTO-ENTMCNC: 32.5 G/DL (ref 32–36)
MCV RBC AUTO: 94 FL (ref 82–98)
NONHDLC SERPL-MCNC: 144 MG/DL
NUCLEATED RBC (/100WBC) (OHS): 0 /100 WBC
PLATELET # BLD AUTO: 207 K/UL (ref 150–450)
PMV BLD AUTO: 12.8 FL (ref 9.2–12.9)
POTASSIUM SERPL-SCNC: 4.3 MMOL/L (ref 3.5–5.1)
PROT SERPL-MCNC: 8 GM/DL (ref 6–8.4)
RBC # BLD AUTO: 5.8 M/UL (ref 4.6–6.2)
RELATIVE EOSINOPHIL (OHS): 6.1 %
RELATIVE LYMPHOCYTE (OHS): 26.2 % (ref 18–48)
RELATIVE MONOCYTE (OHS): 9.3 % (ref 4–15)
RELATIVE NEUTROPHIL (OHS): 57.7 % (ref 38–73)
SODIUM SERPL-SCNC: 142 MMOL/L (ref 136–145)
TRIGL SERPL-MCNC: 129 MG/DL (ref 30–150)
WBC # BLD AUTO: 7.09 K/UL (ref 3.9–12.7)

## 2025-07-18 PROCEDURE — 83036 HEMOGLOBIN GLYCOSYLATED A1C: CPT

## 2025-07-18 PROCEDURE — 80061 LIPID PANEL: CPT

## 2025-07-18 PROCEDURE — 85025 COMPLETE CBC W/AUTO DIFF WBC: CPT

## 2025-07-18 PROCEDURE — 36415 COLL VENOUS BLD VENIPUNCTURE: CPT | Mod: PO

## 2025-07-18 PROCEDURE — 87389 HIV-1 AG W/HIV-1&-2 AB AG IA: CPT

## 2025-07-18 PROCEDURE — 82565 ASSAY OF CREATININE: CPT | Mod: PO

## 2025-07-18 PROCEDURE — 86803 HEPATITIS C AB TEST: CPT

## 2025-08-07 ENCOUNTER — TELEPHONE (OUTPATIENT)
Dept: GASTROENTEROLOGY | Facility: CLINIC | Age: 55
End: 2025-08-07
Payer: COMMERCIAL

## 2025-08-07 NOTE — TELEPHONE ENCOUNTER
Spoke to pt to schedule cscope, pt stated he needs to talk to his wife regarding dates and will call us back later today.   Provided pt with call back number.

## 2025-08-07 NOTE — TELEPHONE ENCOUNTER
Copied from CRM #[id number]. Topic: General Inquiry - Return Call  >> Aug 7, 2025 11:50 AM Sandra wrote:  Type:  Patient Returning Call    Who Called:pt  Who Left Message for Patient:Quita  Does the patient know what this is regarding?:returning call  Would the patient rather a call back or a response via [facility]? call  Best Call Back Number:Telephone Information:  Mobile          [phone]      Additional Information: please advise thank you

## (undated) DEVICE — Device

## (undated) DEVICE — DRAPE EENT SPLIT STERILE

## (undated) DEVICE — SUT 3-0 VICRYL / SH (J416)

## (undated) DEVICE — DRAPE INCISE IOBAN 2 23X17IN

## (undated) DEVICE — SUT SILK 3-0 RB-1 30IN BLK

## (undated) DEVICE — STAPLER SKIN ROTATING HEAD

## (undated) DEVICE — DRESSING MEPORE PRO 6 X 7 CM

## (undated) DEVICE — SYR 10CC LUER LOCK

## (undated) DEVICE — PENCIL ROCKER SWITCH 10FT CORD

## (undated) DEVICE — KIT SAHARA DRAPE DRAW/LIFT

## (undated) DEVICE — PASSER

## (undated) DEVICE — NDL 27G X 1 1/4

## (undated) DEVICE — SUT MONOCRYL 4-0 UND RB-1

## (undated) DEVICE — SUT SILK SH 2-0 30IN MP BLK

## (undated) DEVICE — BLADE TONGUE DEPRESSOR STRL

## (undated) DEVICE — SEE MEDLINE ITEM 154981

## (undated) DEVICE — KIT ANTIFOG

## (undated) DEVICE — DRESSING TRANS 2X2 TEGADERM

## (undated) DEVICE — HANDLE SURG LIGHT NONRIGID

## (undated) DEVICE — ELECTRODE BLADE W/SLEEVE 2.75

## (undated) DEVICE — DRAPE INVISISHIELD TWL 18X24IN

## (undated) DEVICE — STRAP OR TABLE 5IN X 72IN

## (undated) DEVICE — CATH IV INTROCAN 18G X 1 1/4

## (undated) DEVICE — SPONGE KITTNER 1/4X 5/8 L STRL

## (undated) DEVICE — DRAPE HALF SURGICAL 40X58IN

## (undated) DEVICE — SOL NACL 0.9% IV INJ 500ML

## (undated) DEVICE — COVER PROXIMA MAYO STAND

## (undated) DEVICE — MICROSCOPE DRAPE FOR ZEISS S7

## (undated) DEVICE — CORD BIPOLAR 12 FOOT

## (undated) DEVICE — DRAPE STERI INSTRUMENT 1018

## (undated) DEVICE — COVER PROBE US 5.5X58L NON LTX

## (undated) DEVICE — SUT 2/0 30IN SILK BLK BRAI

## (undated) DEVICE — SOL 0.9% NACL IRRI.IN STERIL

## (undated) DEVICE — ELECTRODE EMG NEEDLE

## (undated) DEVICE — ADHESIVE DERMABOND ADVANCED

## (undated) DEVICE — TOWEL OR DISP STRL BLUE 4/PK

## (undated) DEVICE — BLADE SURG #15 CARBON STEEL

## (undated) DEVICE — GLOVE SENSICARE PI ALOE 6

## (undated) DEVICE — SPONGE PATTY SURGICAL .5X3IN

## (undated) DEVICE — BANDAGE ROLL COTTN 4.5INX4.1YD

## (undated) DEVICE — DRAPE HEAD

## (undated) DEVICE — SEE MEDLINE ITEM 153688

## (undated) DEVICE — SPONGE GAUZE 16PLY 4X4